# Patient Record
Sex: FEMALE | Race: BLACK OR AFRICAN AMERICAN | URBAN - METROPOLITAN AREA
[De-identification: names, ages, dates, MRNs, and addresses within clinical notes are randomized per-mention and may not be internally consistent; named-entity substitution may affect disease eponyms.]

---

## 2021-11-29 ENCOUNTER — TRANSCRIBE ORDER (OUTPATIENT)
Dept: SCHEDULING | Age: 41
End: 2021-11-29

## 2021-11-29 DIAGNOSIS — Z12.31 VISIT FOR SCREENING MAMMOGRAM: Primary | ICD-10-CM

## 2021-12-08 ENCOUNTER — HOSPITAL ENCOUNTER (OUTPATIENT)
Dept: MAMMOGRAPHY | Age: 41
Discharge: HOME | End: 2021-12-08
Attending: FAMILY MEDICINE
Payer: COMMERCIAL

## 2021-12-08 DIAGNOSIS — Z12.31 VISIT FOR SCREENING MAMMOGRAM: ICD-10-CM

## 2021-12-08 PROCEDURE — 77067 SCR MAMMO BI INCL CAD: CPT

## 2022-03-16 ENCOUNTER — APPOINTMENT (OUTPATIENT)
Dept: GENERAL RADIOLOGY | Age: 42
End: 2022-03-16
Attending: STUDENT IN AN ORGANIZED HEALTH CARE EDUCATION/TRAINING PROGRAM
Payer: COMMERCIAL

## 2022-03-16 ENCOUNTER — APPOINTMENT (OUTPATIENT)
Dept: CT IMAGING | Age: 42
End: 2022-03-16
Attending: NURSE PRACTITIONER
Payer: COMMERCIAL

## 2022-03-16 ENCOUNTER — APPOINTMENT (OUTPATIENT)
Dept: GENERAL RADIOLOGY | Age: 42
End: 2022-03-16
Attending: NURSE PRACTITIONER
Payer: COMMERCIAL

## 2022-03-16 ENCOUNTER — HOSPITAL ENCOUNTER (OUTPATIENT)
Age: 42
Setting detail: OBSERVATION
Discharge: HOME | End: 2022-03-19
Attending: STUDENT IN AN ORGANIZED HEALTH CARE EDUCATION/TRAINING PROGRAM | Admitting: INTERNAL MEDICINE
Payer: COMMERCIAL

## 2022-03-16 ENCOUNTER — APPOINTMENT (OUTPATIENT)
Dept: VASCULAR SURGERY | Age: 42
End: 2022-03-16
Attending: INTERNAL MEDICINE
Payer: COMMERCIAL

## 2022-03-16 ENCOUNTER — APPOINTMENT (OUTPATIENT)
Dept: NON INVASIVE DIAGNOSTICS | Age: 42
End: 2022-03-16
Attending: STUDENT IN AN ORGANIZED HEALTH CARE EDUCATION/TRAINING PROGRAM
Payer: COMMERCIAL

## 2022-03-16 DIAGNOSIS — R06.02 SOB (SHORTNESS OF BREATH): ICD-10-CM

## 2022-03-16 DIAGNOSIS — R06.82 TACHYPNEA: ICD-10-CM

## 2022-03-16 DIAGNOSIS — I26.09 OTHER ACUTE PULMONARY EMBOLISM WITH ACUTE COR PULMONALE (HCC): Primary | ICD-10-CM

## 2022-03-16 PROBLEM — I26.99 PULMONARY EMBOLI (HCC): Status: ACTIVE | Noted: 2022-03-16

## 2022-03-16 LAB
ALBUMIN SERPL-MCNC: 4.1 G/DL (ref 3.5–5)
ALBUMIN/GLOB SERPL: 0.8 {RATIO} (ref 1.1–2.2)
ALP SERPL-CCNC: 76 U/L (ref 45–117)
ALT SERPL-CCNC: 31 U/L (ref 12–78)
AMORPH CRY URNS QL MICRO: ABNORMAL
ANION GAP SERPL CALC-SCNC: 9 MMOL/L (ref 5–15)
APPEARANCE UR: ABNORMAL
APTT PPP: 25.7 SEC (ref 22.1–31)
APTT PPP: 62 SEC (ref 22.1–31)
AST SERPL-CCNC: 14 U/L (ref 15–37)
BACTERIA URNS QL MICRO: ABNORMAL /HPF
BASE DEFICIT BLDV-SCNC: 1.5 MMOL/L
BASOPHILS # BLD: 0.1 K/UL (ref 0–0.1)
BASOPHILS # BLD: 0.1 K/UL (ref 0–0.1)
BASOPHILS NFR BLD: 1 % (ref 0–1)
BASOPHILS NFR BLD: 1 % (ref 0–1)
BILIRUB SERPL-MCNC: 0.4 MG/DL (ref 0.2–1)
BILIRUB UR QL: NEGATIVE
BNP SERPL-MCNC: 5146 PG/ML
BUN SERPL-MCNC: 9 MG/DL (ref 6–20)
BUN/CREAT SERPL: 7 (ref 12–20)
CALCIUM SERPL-MCNC: 9.5 MG/DL (ref 8.5–10.1)
CHLORIDE SERPL-SCNC: 104 MMOL/L (ref 97–108)
CO2 SERPL-SCNC: 25 MMOL/L (ref 21–32)
COLOR UR: ABNORMAL
COMMENT, HOLDF: NORMAL
COVID-19 RAPID TEST, COVR: ABNORMAL
COVID-19 RAPID TEST, COVR: NOT DETECTED
CREAT SERPL-MCNC: 1.25 MG/DL (ref 0.55–1.02)
DIFFERENTIAL METHOD BLD: ABNORMAL
DIFFERENTIAL METHOD BLD: ABNORMAL
ECHO AO ASC DIAM: 2.5 CM
ECHO AO ASCENDING AORTA INDEX: 1.41 CM/M2
ECHO AV AREA PEAK VELOCITY: 2.9 CM2
ECHO AV AREA VTI: 2.8 CM2
ECHO AV AREA/BSA PEAK VELOCITY: 1.6 CM2/M2
ECHO AV AREA/BSA VTI: 1.6 CM2/M2
ECHO AV MEAN GRADIENT: 3 MMHG
ECHO AV MEAN VELOCITY: 0.8 M/S
ECHO AV PEAK GRADIENT: 3 MMHG
ECHO AV PEAK VELOCITY: 0.9 M/S
ECHO AV VELOCITY RATIO: 1
ECHO AV VTI: 17.1 CM
ECHO LA DIAMETER INDEX: 1.58 CM/M2
ECHO LA DIAMETER: 2.8 CM
ECHO LA VOL 4C: 41 ML (ref 22–52)
ECHO LA VOLUME INDEX A4C: 23 ML/M2 (ref 16–34)
ECHO LV E' LATERAL VELOCITY: 10 CM/S
ECHO LV E' SEPTAL VELOCITY: 5 CM/S
ECHO LV FRACTIONAL SHORTENING: 27 % (ref 28–44)
ECHO LV INTERNAL DIMENSION DIASTOLE INDEX: 1.86 CM/M2
ECHO LV INTERNAL DIMENSION DIASTOLIC: 3.3 CM (ref 3.9–5.3)
ECHO LV INTERNAL DIMENSION SYSTOLIC INDEX: 1.36 CM/M2
ECHO LV INTERNAL DIMENSION SYSTOLIC: 2.4 CM
ECHO LV IVSD: 1.2 CM (ref 0.6–0.9)
ECHO LV MASS 2D: 124.8 G (ref 67–162)
ECHO LV MASS INDEX 2D: 70.5 G/M2 (ref 43–95)
ECHO LV POSTERIOR WALL DIASTOLIC: 1.2 CM (ref 0.6–0.9)
ECHO LV RELATIVE WALL THICKNESS RATIO: 0.73
ECHO LVOT AREA: 3.1 CM2
ECHO LVOT AV VTI INDEX: 0.88
ECHO LVOT DIAM: 2 CM
ECHO LVOT MEAN GRADIENT: 2 MMHG
ECHO LVOT PEAK GRADIENT: 3 MMHG
ECHO LVOT PEAK VELOCITY: 0.9 M/S
ECHO LVOT STROKE VOLUME INDEX: 26.6 ML/M2
ECHO LVOT SV: 47.1 ML
ECHO LVOT VTI: 15 CM
ECHO MV A VELOCITY: 0.56 M/S
ECHO MV E DECELERATION TIME (DT): 217.3 MS
ECHO MV E VELOCITY: 0.39 M/S
ECHO MV E/A RATIO: 0.7
ECHO MV E/E' LATERAL: 3.9
ECHO MV E/E' RATIO (AVERAGED): 5.85
ECHO MV E/E' SEPTAL: 7.8
ECHO PULMONARY ARTERY END DIASTOLIC PRESSURE: 13 MMHG
ECHO PV MAX VELOCITY: 0.8 M/S
ECHO PV PEAK GRADIENT: 2 MMHG
ECHO PV REGURGITANT MAX VELOCITY: 1.8 M/S
ECHO RV INTERNAL DIMENSION: 5 CM
ECHO RV TAPSE: 1.6 CM (ref 1.5–2)
ECHO RVOT PEAK GRADIENT: 1 MMHG
ECHO RVOT PEAK VELOCITY: 0.4 M/S
ECHO TV REGURGITANT MAX VELOCITY: 3.67 M/S
ECHO TV REGURGITANT PEAK GRADIENT: 54 MMHG
EOSINOPHIL # BLD: 0 K/UL (ref 0–0.4)
EOSINOPHIL # BLD: 0 K/UL (ref 0–0.4)
EOSINOPHIL NFR BLD: 0 % (ref 0–7)
EOSINOPHIL NFR BLD: 1 % (ref 0–7)
EPITH CASTS URNS QL MICRO: ABNORMAL /LPF
ERYTHROCYTE [DISTWIDTH] IN BLOOD BY AUTOMATED COUNT: 18.1 % (ref 11.5–14.5)
ERYTHROCYTE [DISTWIDTH] IN BLOOD BY AUTOMATED COUNT: 18.3 % (ref 11.5–14.5)
GLOBULIN SER CALC-MCNC: 5 G/DL (ref 2–4)
GLUCOSE SERPL-MCNC: 175 MG/DL (ref 65–100)
GLUCOSE UR STRIP.AUTO-MCNC: NEGATIVE MG/DL
HCG UR QL: NEGATIVE
HCO3 BLDV-SCNC: 23.8 MMOL/L (ref 23–28)
HCT VFR BLD AUTO: 39.8 % (ref 35–47)
HCT VFR BLD AUTO: 47.4 % (ref 35–47)
HGB BLD-MCNC: 12.6 G/DL (ref 11.5–16)
HGB BLD-MCNC: 15 G/DL (ref 11.5–16)
HGB UR QL STRIP: ABNORMAL
IMM GRANULOCYTES # BLD AUTO: 0 K/UL (ref 0–0.04)
IMM GRANULOCYTES # BLD AUTO: 0 K/UL (ref 0–0.04)
IMM GRANULOCYTES NFR BLD AUTO: 0 % (ref 0–0.5)
IMM GRANULOCYTES NFR BLD AUTO: 0 % (ref 0–0.5)
KETONES UR QL STRIP.AUTO: 15 MG/DL
LEUKOCYTE ESTERASE UR QL STRIP.AUTO: ABNORMAL
LYMPHOCYTES # BLD: 3.1 K/UL (ref 0.8–3.5)
LYMPHOCYTES # BLD: 3.2 K/UL (ref 0.8–3.5)
LYMPHOCYTES NFR BLD: 39 % (ref 12–49)
LYMPHOCYTES NFR BLD: 40 % (ref 12–49)
MAGNESIUM SERPL-MCNC: 2.1 MG/DL (ref 1.6–2.4)
MCH RBC QN AUTO: 27.2 PG (ref 26–34)
MCH RBC QN AUTO: 27.3 PG (ref 26–34)
MCHC RBC AUTO-ENTMCNC: 31.6 G/DL (ref 30–36.5)
MCHC RBC AUTO-ENTMCNC: 31.7 G/DL (ref 30–36.5)
MCV RBC AUTO: 85.9 FL (ref 80–99)
MCV RBC AUTO: 86.3 FL (ref 80–99)
MONOCYTES # BLD: 0.4 K/UL (ref 0–1)
MONOCYTES # BLD: 0.5 K/UL (ref 0–1)
MONOCYTES NFR BLD: 5 % (ref 5–13)
MONOCYTES NFR BLD: 6 % (ref 5–13)
NEUTS SEG # BLD: 4.1 K/UL (ref 1.8–8)
NEUTS SEG # BLD: 4.4 K/UL (ref 1.8–8)
NEUTS SEG NFR BLD: 53 % (ref 32–75)
NEUTS SEG NFR BLD: 54 % (ref 32–75)
NITRITE UR QL STRIP.AUTO: NEGATIVE
NRBC # BLD: 0 K/UL (ref 0–0.01)
NRBC # BLD: 0.02 K/UL (ref 0–0.01)
NRBC BLD-RTO: 0 PER 100 WBC
NRBC BLD-RTO: 0.3 PER 100 WBC
PCO2 BLDV: 41.3 MMHG (ref 41–51)
PH BLDV: 7.37 [PH] (ref 7.32–7.42)
PH UR STRIP: 6 [PH] (ref 5–8)
PLATELET # BLD AUTO: 328 K/UL (ref 150–400)
PLATELET # BLD AUTO: 391 K/UL (ref 150–400)
PMV BLD AUTO: 10 FL (ref 8.9–12.9)
PMV BLD AUTO: 10.2 FL (ref 8.9–12.9)
PO2 BLDV: 16 MMHG (ref 25–40)
POTASSIUM SERPL-SCNC: 3.9 MMOL/L (ref 3.5–5.1)
PROT SERPL-MCNC: 9.1 G/DL (ref 6.4–8.2)
PROT UR STRIP-MCNC: 300 MG/DL
RBC # BLD AUTO: 4.61 M/UL (ref 3.8–5.2)
RBC # BLD AUTO: 5.52 M/UL (ref 3.8–5.2)
RBC #/AREA URNS HPF: ABNORMAL /HPF (ref 0–5)
SAMPLES BEING HELD,HOLD: NORMAL
SAO2 % BLDV: 19.8 % (ref 65–88)
SERVICE CMNT-IMP: ABNORMAL
SODIUM SERPL-SCNC: 138 MMOL/L (ref 136–145)
SOURCE, COVRS: ABNORMAL
SOURCE, COVRS: NORMAL
SP GR UR REFRACTOMETRY: 1.02 (ref 1–1.03)
SPECIMEN TYPE: ABNORMAL
THERAPEUTIC RANGE,PTTT: ABNORMAL SECS (ref 58–77)
THERAPEUTIC RANGE,PTTT: NORMAL SECS (ref 58–77)
TROPONIN-HIGH SENSITIVITY: 1045 NG/L (ref 0–51)
UR CULT HOLD, URHOLD: NORMAL
UROBILINOGEN UR QL STRIP.AUTO: 1 EU/DL (ref 0.2–1)
WBC # BLD AUTO: 7.9 K/UL (ref 3.6–11)
WBC # BLD AUTO: 8 K/UL (ref 3.6–11)
WBC URNS QL MICRO: ABNORMAL /HPF (ref 0–4)

## 2022-03-16 PROCEDURE — 65660000000 HC RM CCU STEPDOWN

## 2022-03-16 PROCEDURE — 74011000258 HC RX REV CODE- 258: Performed by: STUDENT IN AN ORGANIZED HEALTH CARE EDUCATION/TRAINING PROGRAM

## 2022-03-16 PROCEDURE — 74011000636 HC RX REV CODE- 636: Performed by: RADIOLOGY

## 2022-03-16 PROCEDURE — 74011250637 HC RX REV CODE- 250/637: Performed by: INTERNAL MEDICINE

## 2022-03-16 PROCEDURE — 80053 COMPREHEN METABOLIC PANEL: CPT

## 2022-03-16 PROCEDURE — 74011250637 HC RX REV CODE- 250/637: Performed by: NURSE PRACTITIONER

## 2022-03-16 PROCEDURE — 84484 ASSAY OF TROPONIN QUANT: CPT

## 2022-03-16 PROCEDURE — 87635 SARS-COV-2 COVID-19 AMP PRB: CPT

## 2022-03-16 PROCEDURE — 36415 COLL VENOUS BLD VENIPUNCTURE: CPT

## 2022-03-16 PROCEDURE — G0378 HOSPITAL OBSERVATION PER HR: HCPCS

## 2022-03-16 PROCEDURE — 74011000250 HC RX REV CODE- 250: Performed by: INTERNAL MEDICINE

## 2022-03-16 PROCEDURE — 81025 URINE PREGNANCY TEST: CPT

## 2022-03-16 PROCEDURE — 96366 THER/PROPH/DIAG IV INF ADDON: CPT

## 2022-03-16 PROCEDURE — 74011250636 HC RX REV CODE- 250/636: Performed by: STUDENT IN AN ORGANIZED HEALTH CARE EDUCATION/TRAINING PROGRAM

## 2022-03-16 PROCEDURE — 93306 TTE W/DOPPLER COMPLETE: CPT

## 2022-03-16 PROCEDURE — 71275 CT ANGIOGRAPHY CHEST: CPT

## 2022-03-16 PROCEDURE — 74011250636 HC RX REV CODE- 250/636: Performed by: NURSE PRACTITIONER

## 2022-03-16 PROCEDURE — 83735 ASSAY OF MAGNESIUM: CPT

## 2022-03-16 PROCEDURE — 99285 EMERGENCY DEPT VISIT HI MDM: CPT

## 2022-03-16 PROCEDURE — 93970 EXTREMITY STUDY: CPT

## 2022-03-16 PROCEDURE — 93005 ELECTROCARDIOGRAM TRACING: CPT

## 2022-03-16 PROCEDURE — 85025 COMPLETE CBC W/AUTO DIFF WBC: CPT

## 2022-03-16 PROCEDURE — 93306 TTE W/DOPPLER COMPLETE: CPT | Performed by: INTERNAL MEDICINE

## 2022-03-16 PROCEDURE — 71046 X-RAY EXAM CHEST 2 VIEWS: CPT

## 2022-03-16 PROCEDURE — 82803 BLOOD GASES ANY COMBINATION: CPT

## 2022-03-16 PROCEDURE — 81001 URINALYSIS AUTO W/SCOPE: CPT

## 2022-03-16 PROCEDURE — 85730 THROMBOPLASTIN TIME PARTIAL: CPT

## 2022-03-16 PROCEDURE — 83880 ASSAY OF NATRIURETIC PEPTIDE: CPT

## 2022-03-16 PROCEDURE — 96365 THER/PROPH/DIAG IV INF INIT: CPT

## 2022-03-16 RX ORDER — SODIUM CHLORIDE 0.9 % (FLUSH) 0.9 %
5-40 SYRINGE (ML) INJECTION EVERY 8 HOURS
Status: DISCONTINUED | OUTPATIENT
Start: 2022-03-16 | End: 2022-03-19 | Stop reason: HOSPADM

## 2022-03-16 RX ORDER — LISINOPRIL 20 MG/1
20 TABLET ORAL DAILY
COMMUNITY

## 2022-03-16 RX ORDER — LISINOPRIL 20 MG/1
20 TABLET ORAL DAILY
Status: DISCONTINUED | OUTPATIENT
Start: 2022-03-17 | End: 2022-03-19 | Stop reason: HOSPADM

## 2022-03-16 RX ORDER — ONDANSETRON 4 MG/1
4 TABLET, ORALLY DISINTEGRATING ORAL
Status: DISCONTINUED | OUTPATIENT
Start: 2022-03-16 | End: 2022-03-19 | Stop reason: HOSPADM

## 2022-03-16 RX ORDER — HEPARIN SODIUM 1000 [USP'U]/ML
80 INJECTION, SOLUTION INTRAVENOUS; SUBCUTANEOUS ONCE
Status: COMPLETED | OUTPATIENT
Start: 2022-03-16 | End: 2022-03-16

## 2022-03-16 RX ORDER — GUAIFENESIN 100 MG/5ML
324 LIQUID (ML) ORAL
Status: COMPLETED | OUTPATIENT
Start: 2022-03-16 | End: 2022-03-16

## 2022-03-16 RX ORDER — ACETAMINOPHEN 325 MG/1
650 TABLET ORAL
Status: DISCONTINUED | OUTPATIENT
Start: 2022-03-16 | End: 2022-03-19 | Stop reason: HOSPADM

## 2022-03-16 RX ORDER — ACETAMINOPHEN 650 MG/1
650 SUPPOSITORY RECTAL
Status: DISCONTINUED | OUTPATIENT
Start: 2022-03-16 | End: 2022-03-19 | Stop reason: HOSPADM

## 2022-03-16 RX ORDER — ATENOLOL 100 MG/1
100 TABLET ORAL DAILY
COMMUNITY

## 2022-03-16 RX ORDER — ATENOLOL 25 MG/1
25 TABLET ORAL DAILY
Status: DISCONTINUED | OUTPATIENT
Start: 2022-03-16 | End: 2022-03-16

## 2022-03-16 RX ORDER — ONDANSETRON 2 MG/ML
4 INJECTION INTRAMUSCULAR; INTRAVENOUS
Status: DISCONTINUED | OUTPATIENT
Start: 2022-03-16 | End: 2022-03-19 | Stop reason: HOSPADM

## 2022-03-16 RX ORDER — HEPARIN SODIUM 10000 [USP'U]/100ML
18-36 INJECTION, SOLUTION INTRAVENOUS
Status: DISCONTINUED | OUTPATIENT
Start: 2022-03-16 | End: 2022-03-19 | Stop reason: SDUPTHER

## 2022-03-16 RX ORDER — ATENOLOL 50 MG/1
100 TABLET ORAL DAILY
Status: DISCONTINUED | OUTPATIENT
Start: 2022-03-17 | End: 2022-03-19 | Stop reason: HOSPADM

## 2022-03-16 RX ORDER — POLYETHYLENE GLYCOL 3350 17 G/17G
17 POWDER, FOR SOLUTION ORAL DAILY PRN
Status: DISCONTINUED | OUTPATIENT
Start: 2022-03-16 | End: 2022-03-19 | Stop reason: HOSPADM

## 2022-03-16 RX ORDER — SODIUM CHLORIDE 0.9 % (FLUSH) 0.9 %
5-40 SYRINGE (ML) INJECTION AS NEEDED
Status: DISCONTINUED | OUTPATIENT
Start: 2022-03-16 | End: 2022-03-19 | Stop reason: HOSPADM

## 2022-03-16 RX ADMIN — HEPARIN SODIUM 5740 UNITS: 1000 INJECTION INTRAVENOUS; SUBCUTANEOUS at 13:59

## 2022-03-16 RX ADMIN — SODIUM CHLORIDE, PRESERVATIVE FREE 10 ML: 5 INJECTION INTRAVENOUS at 21:06

## 2022-03-16 RX ADMIN — SODIUM CHLORIDE 1000 ML: 9 INJECTION, SOLUTION INTRAVENOUS at 11:47

## 2022-03-16 RX ADMIN — ATENOLOL 25 MG: 50 TABLET ORAL at 17:30

## 2022-03-16 RX ADMIN — HEPARIN SODIUM 18 UNITS/KG/HR: 10000 INJECTION INTRAVENOUS; SUBCUTANEOUS at 13:59

## 2022-03-16 RX ADMIN — ASPIRIN 324 MG: 81 TABLET, CHEWABLE ORAL at 11:46

## 2022-03-16 RX ADMIN — SODIUM CHLORIDE, PRESERVATIVE FREE 10 ML: 5 INJECTION INTRAVENOUS at 16:26

## 2022-03-16 NOTE — PROGRESS NOTES
3/16/2022  2:26 PM  Case management note    Reason for Admission:  PE  Patient came to hospital for SOB and wheezing. Patient is independent and drives. She lives with s/o but not . Patient has history of asthma. Kaycee @ [employer]                     RUR Score:                     Plan for utilizing home health:          PCP: First and Last name:  Collin Oneil MD     Name of Practice:    Are you a current patient: Yes/No:    Approximate date of last visit:    Can you participate in a virtual visit with your PCP:                     Current Advanced Directive/Advance Care Plan: No Order      Healthcare Decision Maker:   Jovanna Apodaca s/o                              Transition of Care Plan:                      1. Home with family assistance  2. PCP follow up  3. AD planning  4. CM to follow for discharge needs    Care Management Interventions  PCP Verified by CM:  Yes (Dr. Cinthia Garcia )  Support Systems: Spouse/Significant Other  Confirm Follow Up Transport: Family  The Plan for Transition of Care is Related to the Following Treatment Goals : PE  Discharge Location  Patient Expects to be Discharged to[de-identified] Home with family assistance  Clara Chaidez

## 2022-03-16 NOTE — ED TRIAGE NOTES
Pt presents to ER with c/o increased SOB x3 weeks. Denies myalgia, fevers, chills, n/v, CP, dizziness. Denies leg swelling. Pt has cough x6 months with phlegm. Pt c/o SOB with exertion. Pt denies h/o blood clots or cardiac issues.

## 2022-03-16 NOTE — ED PROVIDER NOTES
62-year-old female with a past medical history of \"seasonal asthma\" and hypertension presents to the ER today for evaluation of cough, easy fatigability, and shortness of breath. Patient states that her symptoms have been ongoing and progressive over approximately a 3 to 4 weeks span. She also reports daily cough with \"clearish white\" phlegm production that seems to be more prominent in the evening hours. She has been using an  inhaler with no relief in her symptoms. She does not believe that her symptoms were preceded by viral URI like symptoms. She has not been tested for COVID-19. She additionally denies any chest pain (\"but I sometimes have a little bit of chest pressure when the shortness of breath comes\"), fevers, extremity swelling, hemoptysis, or history of VTE. She denies any recent surgical procedures or use of oral contraceptives. She denies any family history of coagulation disorders or premature heart disease. No past medical history on file. No past surgical history on file. No family history on file. Social History     Socioeconomic History    Marital status: SINGLE     Spouse name: Not on file    Number of children: Not on file    Years of education: Not on file    Highest education level: Not on file   Occupational History    Not on file   Tobacco Use    Smoking status: Not on file    Smokeless tobacco: Not on file   Substance and Sexual Activity    Alcohol use: Not on file    Drug use: Not on file    Sexual activity: Not on file   Other Topics Concern    Not on file   Social History Narrative    Not on file     Social Determinants of Health     Financial Resource Strain:     Difficulty of Paying Living Expenses: Not on file   Food Insecurity:     Worried About Running Out of Food in the Last Year: Not on file    Jhony of Food in the Last Year: Not on file   Transportation Needs:     Lack of Transportation (Medical):  Not on file    Lack of Transportation (Non-Medical): Not on file   Physical Activity:     Days of Exercise per Week: Not on file    Minutes of Exercise per Session: Not on file   Stress:     Feeling of Stress : Not on file   Social Connections:     Frequency of Communication with Friends and Family: Not on file    Frequency of Social Gatherings with Friends and Family: Not on file    Attends Jewish Services: Not on file    Active Member of [address] [place] [address] or Organizations: Not on file    Attends Club or Organization Meetings: Not on file    Marital Status: Not on file   Intimate Partner Violence:     Fear of Current or Ex-Partner: Not on file    Emotionally Abused: Not on file    Physically Abused: Not on file    Sexually Abused: Not on file   Housing Stability:     Unable to Pay for Housing in the Last Year: Not on file    Number of [place] in the Last Year: Not on file    Unstable Housing in the Last Year: Not on file         ALLERGIES: Patient has no known allergies. Review of Systems   Constitutional: Positive for fatigue. Negative for fever. HENT: Negative for sore throat. Eyes: Negative for visual disturbance. Respiratory: Positive for cough, chest tightness and shortness of breath. Cardiovascular: Negative for palpitations. Gastrointestinal: Negative for vomiting. Genitourinary: Negative for dysuria. Musculoskeletal: Negative for myalgias. Skin: Negative for rash. Neurological: Negative for syncope. Vitals:    03/16/22 0923   BP: (!) 149/87   Pulse: 90   Resp: 23   Temp: 98.2 °F (36.8 °C)   SpO2: 100%   Weight: 71.7 kg (158 lb)   Height: 5' 4\" (1.626 m)            Physical Exam  Vitals and nursing note reviewed. Constitutional:       General: She is not in acute distress. Appearance: Normal appearance. She is well-developed. She is not ill-appearing. HENT:      Head: Normocephalic and atraumatic.       Right Ear: External ear normal.      Left Ear: External ear normal.      Nose: Nose normal.      Mouth/Throat:      Mouth: Mucous membranes are moist.      Pharynx: Oropharynx is clear. Eyes:      General: No scleral icterus. Extraocular Movements: Extraocular movements intact. Conjunctiva/sclera: Conjunctivae normal.      Pupils: Pupils are equal, round, and reactive to light. Cardiovascular:      Rate and Rhythm: Normal rate and regular rhythm. Pulses: Normal pulses. Radial pulses are 2+ on the right side and 2+ on the left side. Posterior tibial pulses are 2+ on the right side and 2+ on the left side. Heart sounds: Normal heart sounds. Pulmonary:      Effort: Tachypnea present. Comments: Clear lung sounds with mild diminished airflow in the left base  Chest:      Chest wall: No tenderness. Abdominal:      General: Abdomen is flat. Bowel sounds are normal.      Palpations: Abdomen is soft. Musculoskeletal:         General: Normal range of motion. Cervical back: Normal range of motion and neck supple. No rigidity. No muscular tenderness. Right lower leg: No edema. Left lower leg: No edema. Skin:     General: Skin is warm and dry. Coloration: Skin is not pale. Neurological:      General: No focal deficit present. Mental Status: She is alert and oriented to person, place, and time. Psychiatric:         Mood and Affect: Mood is anxious. Behavior: Behavior normal.         Thought Content:  Thought content normal.         Judgment: Judgment normal.          MDM      VITAL SIGNS:  Patient Vitals for the past 4 hrs:   Temp Pulse Resp BP SpO2   03/16/22 1145  88 23 (!) 129/98 96 %   03/16/22 1130  80 28 (!) 139/124 97 %   03/16/22 1115  79 24 (!) 120/91 96 %   03/16/22 1100  82 26 110/84 98 %   03/16/22 1045  81 13 129/88 97 %   03/16/22 1030  82 18 124/83 95 %   03/16/22 1015  90 28  96 %   03/16/22 1000  89 20 (!) 136/115 96 %   03/16/22 0923 98.2 °F (36.8 °C) 90 23 (!) 149/87 100 % LABS:  Recent Results (from the past 6 hour(s))   EKG, 12 LEAD, INITIAL    Collection Time: 03/16/22  9:46 AM   Result Value Ref Range    Ventricular Rate 87 BPM    Atrial Rate 87 BPM    P-R Interval 146 ms    QRS Duration 86 ms    Q-T Interval 432 ms    QTC Calculation (Bezet) 519 ms    Calculated P Axis 59 degrees    Calculated R Axis 59 degrees    Calculated T Axis -9 degrees    Diagnosis       Normal sinus rhythm  T wave abnormality, consider inferior ischemia  T wave abnormality, consider anterior ischemia  Prolonged QT  Abnormal ECG  No previous ECGs available     SAMPLES BEING HELD    Collection Time: 03/16/22 10:00 AM   Result Value Ref Range    SAMPLES BEING HELD RED,BLUE,SST     COMMENT        Add-on orders for these samples will be processed based on acceptable specimen integrity and analyte stability, which may vary by analyte. CBC WITH AUTOMATED DIFF    Collection Time: 03/16/22 10:00 AM   Result Value Ref Range    WBC 8.0 3.6 - 11.0 K/uL    RBC 5.52 (H) 3.80 - 5.20 M/uL    HGB 15.0 11.5 - 16.0 g/dL    HCT 47.4 (H) 35.0 - 47.0 %    MCV 85.9 80.0 - 99.0 FL    MCH 27.2 26.0 - 34.0 PG    MCHC 31.6 30.0 - 36.5 g/dL    RDW 18.3 (H) 11.5 - 14.5 %    PLATELET 226 542 - 469 K/uL    MPV 10.2 8.9 - 12.9 FL    NRBC 0.0 0  WBC    ABSOLUTE NRBC 0.00 0.00 - 0.01 K/uL    NEUTROPHILS 54 32 - 75 %    LYMPHOCYTES 39 12 - 49 %    MONOCYTES 5 5 - 13 %    EOSINOPHILS 1 0 - 7 %    BASOPHILS 1 0 - 1 %    IMMATURE GRANULOCYTES 0 0.0 - 0.5 %    ABS. NEUTROPHILS 4.4 1.8 - 8.0 K/UL    ABS. LYMPHOCYTES 3.1 0.8 - 3.5 K/UL    ABS. MONOCYTES 0.4 0.0 - 1.0 K/UL    ABS. EOSINOPHILS 0.0 0.0 - 0.4 K/UL    ABS. BASOPHILS 0.1 0.0 - 0.1 K/UL    ABS. IMM.  GRANS. 0.0 0.00 - 0.04 K/UL    DF AUTOMATED     METABOLIC PANEL, COMPREHENSIVE    Collection Time: 03/16/22 10:00 AM   Result Value Ref Range    Sodium 138 136 - 145 mmol/L    Potassium 3.9 3.5 - 5.1 mmol/L    Chloride 104 97 - 108 mmol/L    CO2 25 21 - 32 mmol/L    Anion gap 9 5 - 15 mmol/L    Glucose 175 (H) 65 - 100 mg/dL    BUN 9 6 - 20 MG/DL    Creatinine 1.25 (H) 0.55 - 1.02 MG/DL    BUN/Creatinine ratio 7 (L) 12 - 20      GFR est AA 58 (L) >60 ml/min/1.73m2    GFR est non-AA 47 (L) >60 ml/min/1.73m2    Calcium 9.5 8.5 - 10.1 MG/DL    Bilirubin, total 0.4 0.2 - 1.0 MG/DL    ALT (SGPT) 31 12 - 78 U/L    AST (SGOT) 14 (L) 15 - 37 U/L    Alk. phosphatase 76 45 - 117 U/L    Protein, total 9.1 (H) 6.4 - 8.2 g/dL    Albumin 4.1 3.5 - 5.0 g/dL    Globulin 5.0 (H) 2.0 - 4.0 g/dL    A-G Ratio 0.8 (L) 1.1 - 2.2     TROPONIN-HIGH SENSITIVITY    Collection Time: 03/16/22 10:00 AM   Result Value Ref Range    Troponin-High Sensitivity 1,045 (HH) 0 - 51 ng/L   URINALYSIS W/MICROSCOPIC    Collection Time: 03/16/22 10:00 AM   Result Value Ref Range    Color YELLOW/STRAW      Appearance TURBID (A) CLEAR      Specific gravity 1.023 1.003 - 1.030      pH (UA) 6.0 5.0 - 8.0      Protein 300 (A) NEG mg/dL    Glucose Negative NEG mg/dL    Ketone 15 (A) NEG mg/dL    Bilirubin Negative NEG      Blood MODERATE (A) NEG      Urobilinogen 1.0 0.2 - 1.0 EU/dL    Nitrites Negative NEG      Leukocyte Esterase TRACE (A) NEG      WBC 5-10 0 - 4 /hpf    RBC 20-50 0 - 5 /hpf    Epithelial cells MANY (A) FEW /lpf    Bacteria 1+ (A) NEG /hpf    Amorphous Crystals 1+ (A) NEG   URINE CULTURE HOLD SAMPLE    Collection Time: 03/16/22 10:00 AM    Specimen: Serum; Urine   Result Value Ref Range    Urine culture hold        Urine on hold in Microbiology dept for 2 days. If unpreserved urine is submitted, it cannot be used for addtional testing after 24 hours, recollection will be required.    COVID-19 RAPID TEST    Collection Time: 03/16/22 10:00 AM   Result Value Ref Range    Specimen source Nasopharyngeal      COVID-19 rapid test Indeterminate (A) NOTD     MAGNESIUM    Collection Time: 03/16/22 10:00 AM   Result Value Ref Range    Magnesium 2.1 1.6 - 2.4 mg/dL   NT-PRO BNP    Collection Time: 03/16/22 10:00 AM   Result Value Ref Range    NT pro-BNP 5,146 (H) <125 PG/ML   HCG URINE, QL. - POC    Collection Time: 03/16/22 10:18 AM   Result Value Ref Range    Pregnancy test,urine (POC) Negative NEG     POC VENOUS BLOOD GAS    Collection Time: 03/16/22 10:22 AM   Result Value Ref Range    pH, venous (POC) 7.37 7.32 - 7.42      pCO2, venous (POC) 41.3 41 - 51 MMHG    pO2, venous (POC) 16 (L) 25 - 40 mmHg    HCO3, venous (POC) 23.8 23.0 - 28.0 MMOL/L    sO2, venous (POC) 19.8 (L) 65 - 88 %    Base deficit, venous (POC) 1.5 mmol/L    Specimen type (POC) VENOUS BLOOD      Performed by Julee Sands    COVID-19 RAPID TEST    Collection Time: 03/16/22 10:57 AM   Result Value Ref Range    Specimen source Nasopharyngeal      COVID-19 rapid test Not detected NOTD          IMAGING:  CTA CHEST W OR W WO CONT   Final Result   Fairly significant bilateral pulmonary emboli. The findings were called to Dr. Jessica Grewal on 3/16/22 at  by myself. [phone]      XR CHEST PA LAT   Final Result   No acute process. Medications During Visit:  Medications   iopamidoL (ISOVUE-370) 76 % injection 100 mL (has no administration in time range)   sodium chloride 0.9 % bolus infusion 1,000 mL (1,000 mL IntraVENous New Bag 3/16/22 1147)   heparin (porcine) 1,000 unit/mL injection 5,740 Units (has no administration in time range)   heparin 25,000 units in D5W 250 ml infusion (has no administration in time range)   aspirin chewable tablet 324 mg (324 mg Oral Given 3/16/22 1146)         DECISION MAKING:  Netta Hernandez is a 37 y.o. female who comes in as above. Work-up remarkable for large bilateral PE with elevated troponin and proBNP Patient remains normoxic and hemodynamically stable. Patient started on heparin drip and will be admitted to medicine for further management. Perfect Serve Consult for Admission  12:30 PM    ED Room Number: ER08/08  Patient Name and age:  Netta Hernandez 37 y.o.  female  Working Diagnosis:   1.  Other acute pulmonary embolism with acute cor pulmonale (HCC)    2. SOB (shortness of breath)    3. Tachypnea        COVID-19 Suspicion:  no  Sepsis present:  no  Reassessment needed: no  Code Status:  Full Code  Readmission: no  Isolation Requirements:  no  Recommended Level of Care:  step down  Department:[facility] ED - [phone]  Other:  Large bilateral unprovoked PE. IMPRESSION:  1. Other acute pulmonary embolism with acute cor pulmonale ([address]    2. SOB (shortness of breath)    3. Tachypnea        DISPOSITION:  Admitted        CRITICAL CARE NOTE :    12:32 PM      IMPENDING DETERIORATION -Respiratory and Cardiovascular    ASSOCIATED RISK FACTORS - Hypotension and Shock    MANAGEMENT- Supervision of Care    INTERPRETATION -  Xrays, CT Scan, Blood Gases, ECG and Blood Pressure    INTERVENTIONS - hemodynamic mngmt and Initiation of anticoagulation for large bilateral pulmonary emboli. TREATMENT RESPONSE -Stable    PERFORMED BY - Self        NOTES   :      I have spent 40 minutes of critical care time involved in lab review, consultations with specialist, family decision- making, bedside attention and documentation. During this entire length of time I was immediately available to the patient .     Uyen Muller NP

## 2022-03-16 NOTE — PROGRESS NOTES
Spiritual Care Assessment/Progress Note  [address]      NAME: Wade Jc      MRN: [id number]  AGE: 37 y.o. SEX: female  Jewish Affiliation: No preference   Language: English     3/16/2022     Total Time (in minutes): 5     Spiritual Assessment begun in [facility] EMERGENCY DEPT through conversation with:         []Patient        [] Family    [] Friend(s)        Reason for Consult: Emergency Department visit     Spiritual beliefs: (Please include comment if needed)     [] Identifies with a mariano tradition:         [] Supported by a mariano community:            [] Claims no spiritual orientation:           [] Seeking spiritual identity:                [] Adheres to an individual form of spirituality:           [x] Not able to assess:                           Identified resources for coping:      [] Prayer                               [] Music                  [] Guided Imagery     [] Family/friends                 [] Pet visits     [] Devotional reading                         [x] Unknown     [] Other:                                              Interventions offered during this visit: (See comments for more details)    Patient Interventions: Initial visit           Plan of Care:     [] Support spiritual and/or cultural needs    [] Support AMD and/or advance care planning process      [] Support grieving process   [] Coordinate Rites and/or Rituals    [] Coordination with community clergy   [] No spiritual needs identified at this time   [] Detailed Plan of Care below (See Comments)  [] Make referral to Music Therapy  [] Make referral to Pet Therapy     [] Make referral to Addiction services  [] Make referral to [facility]  [] Make referral to Spiritual Care Partner  [] No future visits requested        [x] Contact Spiritual Care for further referrals     Attempted to visit pt for initial spiritual assessment. Unable to complete assessment at this time, pt sleeping and did not awake.  Pt's chart was consulted.   Chaplain Swan MDiv, MS, [facility]

## 2022-03-17 LAB
ANION GAP SERPL CALC-SCNC: 9 MMOL/L (ref 5–15)
APTT PPP: 51.4 SEC (ref 22.1–31)
APTT PPP: 59.1 SEC (ref 22.1–31)
ATRIAL RATE: 87 BPM
BUN SERPL-MCNC: 9 MG/DL (ref 6–20)
BUN/CREAT SERPL: 12 (ref 12–20)
CALCIUM SERPL-MCNC: 8.5 MG/DL (ref 8.5–10.1)
CALCULATED P AXIS, ECG09: 59 DEGREES
CALCULATED R AXIS, ECG10: 59 DEGREES
CALCULATED T AXIS, ECG11: -9 DEGREES
CHLORIDE SERPL-SCNC: 110 MMOL/L (ref 97–108)
CO2 SERPL-SCNC: 21 MMOL/L (ref 21–32)
CREAT SERPL-MCNC: 0.74 MG/DL (ref 0.55–1.02)
DIAGNOSIS, 93000: NORMAL
ERYTHROCYTE [DISTWIDTH] IN BLOOD BY AUTOMATED COUNT: 17.9 % (ref 11.5–14.5)
GLUCOSE SERPL-MCNC: 147 MG/DL (ref 65–100)
HCT VFR BLD AUTO: 37.4 % (ref 35–47)
HGB BLD-MCNC: 11.8 G/DL (ref 11.5–16)
MCH RBC QN AUTO: 26.9 PG (ref 26–34)
MCHC RBC AUTO-ENTMCNC: 31.6 G/DL (ref 30–36.5)
MCV RBC AUTO: 85.4 FL (ref 80–99)
NRBC # BLD: 0 K/UL (ref 0–0.01)
NRBC BLD-RTO: 0 PER 100 WBC
P-R INTERVAL, ECG05: 146 MS
PLATELET # BLD AUTO: 303 K/UL (ref 150–400)
PMV BLD AUTO: 10.1 FL (ref 8.9–12.9)
POTASSIUM SERPL-SCNC: 3.8 MMOL/L (ref 3.5–5.1)
Q-T INTERVAL, ECG07: 432 MS
QRS DURATION, ECG06: 86 MS
QTC CALCULATION (BEZET), ECG08: 519 MS
RBC # BLD AUTO: 4.38 M/UL (ref 3.8–5.2)
SODIUM SERPL-SCNC: 140 MMOL/L (ref 136–145)
THERAPEUTIC RANGE,PTTT: ABNORMAL SECS (ref 58–77)
THERAPEUTIC RANGE,PTTT: ABNORMAL SECS (ref 58–77)
TROPONIN-HIGH SENSITIVITY: 872 NG/L (ref 0–51)
VENTRICULAR RATE, ECG03: 87 BPM
WBC # BLD AUTO: 9.8 K/UL (ref 3.6–11)

## 2022-03-17 PROCEDURE — 96366 THER/PROPH/DIAG IV INF ADDON: CPT

## 2022-03-17 PROCEDURE — 74011000250 HC RX REV CODE- 250: Performed by: INTERNAL MEDICINE

## 2022-03-17 PROCEDURE — 74011000258 HC RX REV CODE- 258: Performed by: STUDENT IN AN ORGANIZED HEALTH CARE EDUCATION/TRAINING PROGRAM

## 2022-03-17 PROCEDURE — 36415 COLL VENOUS BLD VENIPUNCTURE: CPT

## 2022-03-17 PROCEDURE — 85730 THROMBOPLASTIN TIME PARTIAL: CPT

## 2022-03-17 PROCEDURE — 85027 COMPLETE CBC AUTOMATED: CPT

## 2022-03-17 PROCEDURE — G0378 HOSPITAL OBSERVATION PER HR: HCPCS

## 2022-03-17 PROCEDURE — APPSS30 APP SPLIT SHARED TIME 16-30 MINUTES: Performed by: NURSE PRACTITIONER

## 2022-03-17 PROCEDURE — 74011250636 HC RX REV CODE- 250/636: Performed by: STUDENT IN AN ORGANIZED HEALTH CARE EDUCATION/TRAINING PROGRAM

## 2022-03-17 PROCEDURE — 65660000000 HC RM CCU STEPDOWN

## 2022-03-17 PROCEDURE — 80048 BASIC METABOLIC PNL TOTAL CA: CPT

## 2022-03-17 PROCEDURE — 99205 OFFICE O/P NEW HI 60 MIN: CPT | Performed by: SPECIALIST

## 2022-03-17 PROCEDURE — 74011250637 HC RX REV CODE- 250/637: Performed by: INTERNAL MEDICINE

## 2022-03-17 PROCEDURE — 84484 ASSAY OF TROPONIN QUANT: CPT

## 2022-03-17 RX ADMIN — LISINOPRIL 20 MG: 20 TABLET ORAL at 08:28

## 2022-03-17 RX ADMIN — SODIUM CHLORIDE, PRESERVATIVE FREE 10 ML: 5 INJECTION INTRAVENOUS at 14:00

## 2022-03-17 RX ADMIN — SODIUM CHLORIDE, PRESERVATIVE FREE 10 ML: 5 INJECTION INTRAVENOUS at 21:45

## 2022-03-17 RX ADMIN — HEPARIN SODIUM 18 UNITS/KG/HR: 10000 INJECTION INTRAVENOUS; SUBCUTANEOUS at 20:06

## 2022-03-17 RX ADMIN — SODIUM CHLORIDE, PRESERVATIVE FREE 10 ML: 5 INJECTION INTRAVENOUS at 05:11

## 2022-03-17 RX ADMIN — ATENOLOL 100 MG: 50 TABLET ORAL at 08:28

## 2022-03-17 RX ADMIN — HEPARIN SODIUM 18 UNITS/KG/HR: 10000 INJECTION INTRAVENOUS; SUBCUTANEOUS at 10:09

## 2022-03-17 NOTE — PROGRESS NOTES
3/17/2022   CARE MANAGEMENT NOTE:  Pt transferred from [facility] to the 5th floor. EMR reviewed and handoff received from previous  Mary Sutherland). Pt was admitted for PE. Reportedly, pt resides with her significant other Laura Clayton ([phone]). RUR 6%    Transition Plan of Care:  1. Plan is for pt to return home  2. Outpt f/u  3. Pt will arrange her own transport home    CM will continue to follow pt until discharged.   Sabrina

## 2022-03-17 NOTE — PROGRESS NOTES
[facility]  [address] [facility], [address] [place], [place] [address]   [phone]      Medical Progress Note      NAME: Alexei Sun   :  1980  MRM:  [id number]    Date of service: 3/17/2022  7:07 AM       Assessment and Plan:   1.  Pulmonary emboli ([address] (3/16/2022).   CT of the chest: bilateral pulmonary emboli.  Continue heparin drip transition to JOAN. Echocardiogram: Right ventricle is mildly dilated. Normal wall thickness. Global hypokinesis present. Mildly reduced systolic function. Tricuspid Valve: Moderately elevated RVSP. bilateral leg Doppler is negative for DVT.              2.  Exertional SOB/ elevated proBNP.  This is most likely secondary to above.  echocardiogram as above. Cardiology consult              3.  HTN.  Resume home atenolol and lisinopril           4. Elevated troponin. Denies chest pain. Likely type 2 MI due to PE. Consult cardiology. Echocardiogram as above. Subjective:     Chief Complaint[de-identified] Patient was seen and examined as a follow up for BL PE. Chart was reviewed. SOB is better     ROS:  (bold if positive, if negative)    Tolerating PT  Tolerating Diet        Objective:     Last 24hrs VS reviewed since prior progress note.  Most recent are:    Visit Vitals  BP (!) 145/98 (BP 1 Location: Left upper arm, BP Patient Position: At rest;Lying)   Pulse 77   Temp 98.1 °F (36.7 °C)   Resp 25   Ht 5' 4\" (1.626 m)   Wt 72.1 kg (158 lb 15.2 oz)   SpO2 97%   BMI 27.28 kg/m²     SpO2 Readings from Last 6 Encounters:   22 97%            Intake/Output Summary (Last 24 hours) at 3/17/2022 0707  Last data filed at 3/16/2022 2258  Gross per 24 hour   Intake 480 ml   Output    Net 480 ml        Physical Exam:    Gen:  Well-developed, well-nourished, in no acute distress  HEENT:  Pink conjunctivae, PERRL, hearing intact to voice, moist mucous membranes  Neck:  Supple, without masses, thyroid non-tender  Resp:  No accessory muscle use, clear breath sounds without wheezes rales or rhonchi  Card:  No murmurs, normal S1, S2 without thrills, bruits or peripheral edema  Abd:  Soft, non-tender, non-distended, normoactive bowel sounds are present, no palpable organomegaly and no detectable hernias  Lymph:  No cervical or inguinal adenopathy  Musc:  No cyanosis or clubbing  Skin:  No rashes or ulcers, skin turgor is good  Neuro:  Cranial nerves are grossly intact, no focal motor weakness, follows commands appropriately  Psych:  Good insight, oriented to person, place and time, alert  __________________________________________________________________  Medications Reviewed: (see below)  Medications:     Current Facility-Administered Medications   Medication Dose Route Frequency    heparin 25,000 units in D5W 250 ml infusion  18-36 Units/kg/hr IntraVENous TITRATE    sodium chloride (NS) flush 5-40 mL  5-40 mL IntraVENous Q8H    sodium chloride (NS) flush 5-40 mL  5-40 mL IntraVENous PRN    acetaminophen (TYLENOL) tablet 650 mg  650 mg Oral Q6H PRN    Or    acetaminophen (TYLENOL) suppository 650 mg  650 mg Rectal Q6H PRN    polyethylene glycol (MIRALAX) packet 17 g  17 g Oral DAILY PRN    ondansetron (ZOFRAN ODT) tablet 4 mg  4 mg Oral Q8H PRN    Or    ondansetron (ZOFRAN) injection 4 mg  4 mg IntraVENous Q6H PRN    atenoloL (TENORMIN) tablet 100 mg  100 mg Oral DAILY    lisinopriL (PRINIVIL, ZESTRIL) tablet 20 mg  20 mg Oral DAILY        Lab Data Reviewed: (see below)  Lab Review:     Recent Labs     03/17/22  0304 03/16/22  1234 03/16/22  1000   WBC 9.8 7.9 8.0   HGB 11.8 12.6 15.0   HCT 37.4 39.8 47.4*    328 391     Recent Labs     03/17/22  0304 03/16/22  1000    138   K 3.8 3.9   * 104   CO2 21 25   * 175*   BUN 9 9   CREA 0.74 1.25*   CA 8.5 9.5   MG  --  2.1   ALB  --  4.1   TBILI  --  0.4   ALT  --  31     No results found for: GLUCPOC  No results for input(s): PH, PCO2, PO2, HCO3, FIO2 in the last 72 hours.   No results for input(s): INR, INREXT in the last 72 hours. All Micro Results     Procedure Component Value Units Date/Time    COVID-19 RAPID TEST [[id number]] Collected: 03/16/22 1057    Order Status: Completed Specimen: Nasopharyngeal Updated: 03/16/22 1134     Specimen source Nasopharyngeal        COVID-19 rapid test Not detected        Comment: Rapid Abbott ID Now       Rapid NAAT:  The specimen is NEGATIVE for SARS-CoV-2, the novel coronavirus associated with COVID-19. Negative results should be treated as presumptive and, if inconsistent with clinical signs and symptoms or necessary for patient management, should be tested with an alternative molecular assay. Negative results do not preclude SARS-CoV-2 infection and should not be used as the sole basis for patient management decisions. This test has been authorized by the FDA under an Emergency Use Authorization (EUA) for use by authorized laboratories. Fact sheet for Healthcare Providers: Errplanedate.co.nz  Fact sheet for Patients: Errplanedate.co.nz       Methodology: Isothermal Nucleic Acid Amplification         COVID-19 RAPID TEST [[id number]]  (Abnormal) Collected: 03/16/22 1000    Order Status: Completed Specimen: Nasopharyngeal Updated: 03/16/22 1050     Specimen source Nasopharyngeal        COVID-19 rapid test Indeterminate        Comment: Rapid Abbott ID Now       The presence or absence of COVID-19 Viral RNAs cannot be determined. If clinically indicated, please collect a new specimen. This test has been authorized by the FDA under an Emergency Use Authorization (EUA) for use by authorized laboratories.         Fact sheet for Healthcare Providers: ConventionRecommenddate.co.nz  Fact sheet for Patients: Errplanedate.co.nz       Methodology: Isothermal Nucleic Acid Amplification  CALLED TO AND READ BACK BY  RILEY THAKKAR AT 1050/DW         URINE CULTURE HOLD SAMPLE [[id number]] Collected: 03/16/22 1000    Order Status: Completed Specimen: Urine from Serum Updated: 03/16/22 1038     Urine culture hold       Urine on hold in Microbiology dept for 2 days. If unpreserved urine is submitted, it cannot be used for addtional testing after 24 hours, recollection will be required. I have reviewed notes of prior 24hr. Other pertinent lab: Total time spent with patient: 28 I personally reviewed chart, notes, data and current medications in the medical record. I have personally examined and treated the patient at bedside during this period.                  Care Plan discussed with: Patient, Nursing Staff and >50% of time spent in counseling and coordination of care    Discussed:  Care Plan    Prophylaxis:  Hep SQ    Disposition:  Home w/Family           ___________________________________________________    Attending Physician: River Che MD

## 2022-03-17 NOTE — PROGRESS NOTES
Cardiology Initial Care Encounter    Patient: Jing Marie MRN: [id number]     YOB: 1980  Age: 37 y.o. Sex: female      Admit Date: 3/16/2022       Assessment/Plan     1. Elevated troponin, pBNP: trop 1045 in setting of acute PE. TTE w/ nl LV function, EF 55-60%, mod-severe dilated RV with mod-severe dysfunction. pBNP 5146. Likely d/t PE, will order stress test/further ischemic w/u as OP, will arrange f/u appt     2. Acute bilat PE: w/ RV involvement, on heparin gtt. Monitor closely      3. HTN: cont lisinopril, atenolol     4. Graves disease    5. Prolonged QTc: avoid QT prolonging agents     CARDIOLOGY ATTENDING  Patient personally seen and examined. All the elements of history and examination were personally performed. Assessment and plan was discussed and agree. Ms. Gregory Dean admitted with Acute PE. No current CP. Hrt RRR, no murmur, lungs clear     EKG - NSR with anterior and inferior TWI   Echo - LVEF 65-60% and normal wall motion and grade 1 diastology. RV with mod-severe dilation and dysfunction   Hs-Trop ~ 1000    All of these findings can be explained by her significant bilat Acute PE. No additional cardiac testing needed at this time, however we will plan to recheck an echo as an outpatient in a couple of months. Can consider a treadmill stress test as outpatient as well. Marlno Montgomery MD, [facility]     Jing Marie is a 37 y.o.  female  with PMH significant for HTN, Graves disease. Pt presented to the ED due to worsening SOB, burning in her chest. Symptoms began about 1 month ago and have cont to progress, associated with dizziness/lightheadedness and dry cough. Denies any recent fever, chills. No palpitations, orthopnea or edema. Chest CTA revealed bilat PE. Denies any prior hx of blood clots, clotting disorders or using birth control pills.      ECHO 03/16/22    ECHO ADULT COMPLETE 03/16/2022 3/16/2022    Interpretation Summary   Left Ventricle: Left ventricle size is normal. Mildly increased wall thickness. Normal wall motion. Normal left ventricular systolic function with a visually estimated EF of 55 - 60%.  --- > **Reviewed by Dr. Juan Carlos Delarosa --- RV dilation/dysfunction mod to severe, with pulm HTN    The patient has been referred to cardiology for on going management of elevated troponin, pBNP. Review of Symptoms:  Constitutional: +fatigue, weakness  ENT: negative   Respiratory: +SOB  Gastrointestinal: negative  Genitourinary: dysuria, hematuria, frequency   Musculoskeletal:negative  Neurological: negative  Other systems reviewed and negative except as above. Previous cardiac hx  No specialty comments available. Risk factors: HTN    Social History     Tobacco Use    Smoking status: Not on file    Smokeless tobacco: Not on file   Substance Use Topics    Alcohol use: Not on file     No family history on file.     Current Facility-Administered Medications   Medication Dose Route Frequency    heparin 25,000 units in D5W 250 ml infusion  18-36 Units/kg/hr IntraVENous TITRATE    sodium chloride (NS) flush 5-40 mL  5-40 mL IntraVENous Q8H    sodium chloride (NS) flush 5-40 mL  5-40 mL IntraVENous PRN    acetaminophen (TYLENOL) tablet 650 mg  650 mg Oral Q6H PRN    Or    acetaminophen (TYLENOL) suppository 650 mg  650 mg Rectal Q6H PRN    polyethylene glycol (MIRALAX) packet 17 g  17 g Oral DAILY PRN    ondansetron (ZOFRAN ODT) tablet 4 mg  4 mg Oral Q8H PRN    Or    ondansetron (ZOFRAN) injection 4 mg  4 mg IntraVENous Q6H PRN    atenoloL (TENORMIN) tablet 100 mg  100 mg Oral DAILY    lisinopriL (PRINIVIL, ZESTRIL) tablet 20 mg  20 mg Oral DAILY       Objective:     Vitals:    03/17/22 0740 03/17/22 0943 03/17/22 1129 03/17/22 1531   BP: (!) 130/95 (!) 154/99 121/87 (!) 142/91   Pulse: 79 81 75 73   Resp: 25 22 18 18   Temp: 97.9 °F (36.6 °C) 97.8 °F (36.6 °C) 98.9 °F (37.2 °C) 98 °F (36.7 °C)   SpO2: 96% 97% 96% 100%   Weight: Height:            Intake and Output:  Current Shift: 03/17 0701 - 03/17 1900  In: 120 [P.O.:120]  Out: -   Last three shifts: 03/15 1901 - 03/17 0700  In: 480 [P.O.:480]  Out: -           Gen: Well-developed, well-nourished, in no acute distress  Neck: Supple,No JVD, No Carotid Bruit,   Resp: No accessory muscle use, Clear breath sounds, No rales or rhonchi  Card: Regular Rate,Rythm,Normal S1, S2, No murmurs, rubs or gallop. No thrills. Abd:  Soft, non-tender, non-distended,BS+,   MSK: No cyanosis  Skin: No rashes    Neuro: moving all four extremities , follows commands appropriately  Psych:  Good insight, oriented to person, place , alert, Nml Affect  LE: No edema    EKG:    Normal sinus rhythm   T wave abnormality, consider inferior ischemia   T wave abnormality, consider anterior ischemia   Prolonged QT           TELEMETRY SR    Lab/Data Review: All lab results for the last 24 hours reviewed.      Signed By: Epifanio Valdez NP     March 17, 2022

## 2022-03-17 NOTE — PROGRESS NOTES
TRANSFER - OUT REPORT:    Verbal report given to [facility]) on Netta Hernandez (patient name)  being transferred to [phone] (unit) for routine progression of care   Report consisted of patients Situation, Background, Assessment and   Recommendations(SBAR).     RILEY French

## 2022-03-17 NOTE — PROGRESS NOTES
1900 - Bedside and verbal shift change report given to Laura Cannon RN (oncoming nurse) by Louise Jacques RN (offgoing nurse). Report included the following information: SBAR, Kardex, Intake/Output, MAR, Recent Results, and Med Rec Status. This patient was assisted with Intentional Toileting every 2 hours during this shift as appropriate. Documentation of ambulation and output reflected on Flowsheet as appropriate. Purposeful hourly rounding was completed using AIDET and 5Ps. Outcomes of PHR documented as they occurred. Bed alarm in use as appropriate. Dual Suction and ambubag in place. 0700 - Bedside and verbal shift change report given to Louise Jacques RN (oncoming nurse) by Laura Cannon RN (offgoing nurse). Report included the following: SBAR, Kardex, Intake/Output, MAR, Recent Results, and Med Rec Status.

## 2022-03-17 NOTE — PROGRESS NOTES
0700 Bedside and Verbal shift change report given to SUSSY LIN (oncoming nurse) by Kevin Molina RN (offgoing nurse). Report included the following information SBAR, Kardex, Intake/Output, MAR, Accordion, Recent Results and Med Rec Status. This patient was assisted with Intentional Toileting every 2 hours during this shift as appropriate. Documentation of ambulation and output reflected on Flowsheet as appropriate. Purposeful hourly rounding was completed using AIDET and 5Ps. Outcomes of PHR documented as they occurred. Bed alarm in use as appropriate. Dual Suction and ambubag in place. 1645 TRANSFER - OUT REPORT:    Verbal report given to [facility] RN (name) on Layo Parekh  being transferred to [phone] (unit) for routine progression of care       Report consisted of patients Situation, Background, Assessment and   Recommendations(SBAR). Information from the following report(s) SBAR, Kardex, Intake/Output, MAR, Accordion, Recent Results, Med Rec Status and Cardiac Rhythm NSR was reviewed with the receiving nurse. Lines:   Peripheral IV 03/16/22 Left Antecubital (Active)   Site Assessment Clean, dry, & intact 03/17/22 0307   Phlebitis Assessment 0 03/17/22 0307   Infiltration Assessment 0 03/17/22 0307   Dressing Status Clean, dry, & intact 03/17/22 0307   Dressing Type Transparent 03/17/22 0307   Hub Color/Line Status Pink; Infusing;Flushed 03/17/22 6539   Action Taken Open ports on tubing capped 03/17/22 0307   Alcohol Cap Used Yes 03/17/22 0307       Peripheral IV 03/16/22 Right Antecubital (Active)   Site Assessment Clean, dry, & intact 03/17/22 0307   Phlebitis Assessment 0 03/17/22 0307   Infiltration Assessment 0 03/17/22 0307   Dressing Status Clean, dry, & intact 03/17/22 0307   Dressing Type Transparent 03/17/22 0307   Hub Color/Line Status Pink;Capped;Flushed 03/17/22 0307   Action Taken Open ports on tubing capped 03/17/22 0307   Alcohol Cap Used Yes 03/17/22 4078        Opportunity for questions and clarification was provided.       Patient transported with:   Registered Nurse

## 2022-03-18 LAB
APTT PPP: 56.1 SEC (ref 22.1–31)
APTT PPP: 63.3 SEC (ref 22.1–31)
COMMENT, HOLDF: NORMAL
SAMPLES BEING HELD,HOLD: NORMAL
THERAPEUTIC RANGE,PTTT: ABNORMAL SECS (ref 58–77)
THERAPEUTIC RANGE,PTTT: ABNORMAL SECS (ref 58–77)

## 2022-03-18 PROCEDURE — 74011250636 HC RX REV CODE- 250/636: Performed by: STUDENT IN AN ORGANIZED HEALTH CARE EDUCATION/TRAINING PROGRAM

## 2022-03-18 PROCEDURE — 96375 TX/PRO/DX INJ NEW DRUG ADDON: CPT

## 2022-03-18 PROCEDURE — 99212 OFFICE O/P EST SF 10 MIN: CPT | Performed by: SPECIALIST

## 2022-03-18 PROCEDURE — 96366 THER/PROPH/DIAG IV INF ADDON: CPT

## 2022-03-18 PROCEDURE — 74011000258 HC RX REV CODE- 258: Performed by: STUDENT IN AN ORGANIZED HEALTH CARE EDUCATION/TRAINING PROGRAM

## 2022-03-18 PROCEDURE — G0378 HOSPITAL OBSERVATION PER HR: HCPCS

## 2022-03-18 PROCEDURE — 74011250636 HC RX REV CODE- 250/636: Performed by: INTERNAL MEDICINE

## 2022-03-18 PROCEDURE — 36415 COLL VENOUS BLD VENIPUNCTURE: CPT

## 2022-03-18 PROCEDURE — 85730 THROMBOPLASTIN TIME PARTIAL: CPT

## 2022-03-18 PROCEDURE — 74011000250 HC RX REV CODE- 250: Performed by: INTERNAL MEDICINE

## 2022-03-18 PROCEDURE — 65660000000 HC RM CCU STEPDOWN

## 2022-03-18 PROCEDURE — 74011250637 HC RX REV CODE- 250/637: Performed by: INTERNAL MEDICINE

## 2022-03-18 RX ORDER — HYDRALAZINE HYDROCHLORIDE 20 MG/ML
10 INJECTION INTRAMUSCULAR; INTRAVENOUS
Status: DISCONTINUED | OUTPATIENT
Start: 2022-03-18 | End: 2022-03-19 | Stop reason: HOSPADM

## 2022-03-18 RX ORDER — HYDRALAZINE HYDROCHLORIDE 20 MG/ML
10 INJECTION INTRAMUSCULAR; INTRAVENOUS ONCE
Status: COMPLETED | OUTPATIENT
Start: 2022-03-18 | End: 2022-03-18

## 2022-03-18 RX ADMIN — ATENOLOL 100 MG: 50 TABLET ORAL at 08:32

## 2022-03-18 RX ADMIN — SODIUM CHLORIDE, PRESERVATIVE FREE 10 ML: 5 INJECTION INTRAVENOUS at 22:11

## 2022-03-18 RX ADMIN — SODIUM CHLORIDE, PRESERVATIVE FREE 10 ML: 5 INJECTION INTRAVENOUS at 03:42

## 2022-03-18 RX ADMIN — LISINOPRIL 20 MG: 20 TABLET ORAL at 08:32

## 2022-03-18 RX ADMIN — SODIUM CHLORIDE, PRESERVATIVE FREE 10 ML: 5 INJECTION INTRAVENOUS at 14:00

## 2022-03-18 RX ADMIN — HEPARIN SODIUM 19 UNITS/KG/HR: 10000 INJECTION INTRAVENOUS; SUBCUTANEOUS at 07:14

## 2022-03-18 RX ADMIN — HEPARIN SODIUM 19 UNITS/KG/HR: 10000 INJECTION INTRAVENOUS; SUBCUTANEOUS at 05:53

## 2022-03-18 RX ADMIN — HEPARIN SODIUM 20 UNITS/KG/HR: 10000 INJECTION INTRAVENOUS; SUBCUTANEOUS at 19:36

## 2022-03-18 RX ADMIN — HYDRALAZINE HYDROCHLORIDE 10 MG: 20 INJECTION INTRAMUSCULAR; INTRAVENOUS at 09:04

## 2022-03-18 NOTE — PROGRESS NOTES
Lenin Calderon MD. [facility]              Patient: Giselle Muhammad  : 1980      Today's Date: 3/18/2022        CARDIOLOGY PROGRESS NOTE  S: Still with DENIS, but no CP  O:  Physical Exam:  Visit Vitals  /89 (BP 1 Location: Left upper arm, BP Patient Position: At rest;Sitting)   Pulse 79   Temp 97.7 °F (36.5 °C)   Resp 16   Ht 5' 4\" (1.626 m)   Wt 158 lb 15.2 oz (72.1 kg)   LMP 2021   SpO2 98%   BMI 27.28 kg/m²     Patient appears generally well, mood and affect are appropriate and pleasant. HEENT:  Hearing intact, non-icteric, normocephalic, atraumatic. Neck Exam: Supple. Lung Exam: Clear to auscultation, even breath sounds. Cardiac Exam: Regular rate and rhythm with no murmur or rub  Abdomen: Soft, non-tender,    Extremities: MAW, No lower extremity edema. MSKTL: Overall good ROM ext  Skin: No significant rashes  Psych: Appropriate affect  Neuro - Grossly intact    Review of Symptoms:  Constitutional: Negative for fever   HEENT: Negative for vision changes. Respiratory: Negative for productive cough  Cardiovascular: Negative for syncope    Gastrointestinal: Negative for abdominal pain, melena  Genitourinary: Negative for dysuria  Skin: Negative for rash  Heme: No problems bleeding. Neuro - no speech changes or focal weaknesses        LABS / OTHER STUDIES reviewed:     Recent Results (from the past 24 hour(s))   TROPONIN-HIGH SENSITIVITY    Collection Time: 22  5:51 PM   Result Value Ref Range    Troponin-High Sensitivity 872 (HH) 0 - 51 ng/L   PTT    Collection Time: 22  9:46 PM   Result Value Ref Range    aPTT 51.4 (H) 22.1 - 31.0 sec    aPTT, therapeutic range     58.0 - 77.0 SECS   SAMPLES BEING HELD    Collection Time: 22  3:40 AM   Result Value Ref Range    SAMPLES BEING HELD 1pst,1lav     COMMENT        Add-on orders for these samples will be processed based on acceptable specimen integrity and analyte stability, which may vary by analyte.    PTT Collection Time: 03/18/22  3:43 AM   Result Value Ref Range    aPTT 63.3 (H) 22.1 - 31.0 sec    aPTT, therapeutic range     58.0 - 77.0 SECS   PTT    Collection Time: 03/18/22  2:30 PM   Result Value Ref Range    aPTT 56.1 (H) 22.1 - 31.0 sec    aPTT, therapeutic range     58.0 - 77.0 SECS           ASSESSMENT AND PLAN:     Assessment and Plan:    1) Acute PE  - her findings (abnormal EKG, abnormal echo, and elevated troponin) are all likely related to her significant PE  - LV function and wall motion was normal   - We will repeat an echo as outpatient and can consider a stress test when she has recovered from her acute PE    2) OK to [place] home       Lew Torres MD, [address]  [address]  Ph: [phone]   Ph [phone]

## 2022-03-18 NOTE — PROGRESS NOTES
[address] March 18, 2022       RE: Alexei Sun      To Whom It May Concern,    This is to certify that Alexei Sun was hospitalized at [address] [place] [address] on 3/16/2022 and still in the hospital under treatment. Desk work I okay. Please feel free to contact my office if you have any questions or concerns. Thank you for your assistance in this matter.       Sincerely,  Ford Schofield MD  [phone]

## 2022-03-18 NOTE — PROGRESS NOTES
Patient received with high BP this am. Repeated to verify. Diastolic is more concerning at this time. Gave morning meds for BP. Notified MD regarding BP. Ordered one time dose of hydralazine. Gave med. BP, especially diastolic remains elevated in the low 100's. Reached out to Dr. Edith Casey again to notify him of current outcome despite interventions. Awaiting further instructions. Patient remains on tele and pulse oximetry being monitored remotely. Heparin drip continues to run.      0945  Spoke to Dr. Edith Casey about ongoing high BP. Stated to monitor patient and call him in 30 mins to an hour with continual high BP for further orders. Wants to wait due to other BP meds given this am, giving them more time to be effective. Will continue to monitor.     Litzy Colin [place]

## 2022-03-18 NOTE — PROGRESS NOTES
3/18/2022   CARE MANAGEMENT NOTE:  CM reviewed EMR. Pt was admitted for PE. Reportedly, pt resides with her significant other Abhay Romo ([phone]).    RUR 6%     Transition Plan of Care:  1. Plan is for pt to return home  2. Outpt f/u  3. Pt will arrange her own transport home     CM will continue to follow pt until discharged.   Sabrina

## 2022-03-18 NOTE — PROGRESS NOTES
Bedside, Verbal and Written shift change report given to Nilson Patel (oncoming nurse) by Kevin Morris (offgoing nurse). Report included the following information SBAR, Kardex, ED Summary, Intake/Output, MAR, Recent Results and Med Rec Status.

## 2022-03-18 NOTE — PROGRESS NOTES
Problem: Deep Venous Thrombosis - Risk of  Goal: *Absence of deep venous thrombosis signs and symptoms(Stroke Metric)  Outcome: Progressing Towards Goal  Goal: *Absence of impaired coagulation signs and symptoms  Outcome: Progressing Towards Goal  Goal: *Knowledge of prescribed medications  Outcome: Progressing Towards Goal  Goal: *Absence of bleeding  Outcome: Progressing Towards Goal     Problem: Patient Education: Go to Patient Education Activity  Goal: Patient/Family Education  Outcome: Progressing Towards Goal

## 2022-03-18 NOTE — PROGRESS NOTES
Ron [facility]  [address] [facility], [address] [place] [address]  [phone]      Medical Progress Note      NAME: Lokesh Langford   :  1980  MRM:  [id number]    Date of service: 3/18/2022  7:07 AM       Assessment and Plan:   1.  Pulmonary emboli ([address] (3/16/2022).   CT of the chest: bilateral pulmonary emboli.  Continue heparin drip transition to JOAN. Echocardiogram: Right ventricle is mildly dilated. Normal wall thickness. Global hypokinesis present. Mildly reduced systolic function. Tricuspid Valve: Moderately elevated RVSP. bilateral leg Doppler is negative for DVT.              2.  Exertional SOB/ elevated proBNP.  This is most likely secondary to above.  echocardiogram as above. Cardiology evaluated pt and will have outpatient FU              3.  HTN.  Resume home atenolol and lisinopril           4. Elevated troponin. Denies chest pain. Likely type 2 MI due to PE. Evaluated by cardiology. Echocardiogram as above. Plan to repeat echo in 2 months and stress test as outpatient. Subjective:     Chief Complaint[de-identified] Patient was seen and examined as a follow up for BL PE. Chart was reviewed. still c/o exertional dyspnea. ROS:  (bold if positive, if negative)    Tolerating PT  Tolerating Diet        Objective:     Last 24hrs VS reviewed since prior progress note.  Most recent are:    Visit Vitals  BP (!) 137/95 (BP 1 Location: Left upper arm, BP Patient Position: At rest)   Pulse 73   Temp 97.6 °F (36.4 °C)   Resp 17   Ht 5' 4\" (1.626 m)   Wt 72.1 kg (158 lb 15.2 oz)   SpO2 100%   BMI 27.28 kg/m²     SpO2 Readings from Last 6 Encounters:   22 100%            Intake/Output Summary (Last 24 hours) at 3/18/2022 0724  Last data filed at 3/17/2022 1354  Gross per 24 hour   Intake 120 ml   Output    Net 120 ml        Physical Exam:    Gen:  Well-developed, well-nourished, in no acute distress  HEENT:  Pink conjunctivae, PERRL, hearing intact to voice, moist mucous membranes  Neck:  Supple, without masses, thyroid non-tender  Resp:  No accessory muscle use, clear breath sounds without wheezes rales or rhonchi  Card:  No murmurs, normal S1, S2 without thrills, bruits or peripheral edema  Abd:  Soft, non-tender, non-distended, normoactive bowel sounds are present, no palpable organomegaly and no detectable hernias  Lymph:  No cervical or inguinal adenopathy  Musc:  No cyanosis or clubbing  Skin:  No rashes or ulcers, skin turgor is good  Neuro:  Cranial nerves are grossly intact, no focal motor weakness, follows commands appropriately  Psych:  Good insight, oriented to person, place and time, alert  __________________________________________________________________  Medications Reviewed: (see below)  Medications:     Current Facility-Administered Medications   Medication Dose Route Frequency    heparin 25,000 units in D5W 250 ml infusion  18-36 Units/kg/hr IntraVENous TITRATE    sodium chloride (NS) flush 5-40 mL  5-40 mL IntraVENous Q8H    sodium chloride (NS) flush 5-40 mL  5-40 mL IntraVENous PRN    acetaminophen (TYLENOL) tablet 650 mg  650 mg Oral Q6H PRN    Or    acetaminophen (TYLENOL) suppository 650 mg  650 mg Rectal Q6H PRN    polyethylene glycol (MIRALAX) packet 17 g  17 g Oral DAILY PRN    ondansetron (ZOFRAN ODT) tablet 4 mg  4 mg Oral Q8H PRN    Or    ondansetron (ZOFRAN) injection 4 mg  4 mg IntraVENous Q6H PRN    atenoloL (TENORMIN) tablet 100 mg  100 mg Oral DAILY    lisinopriL (PRINIVIL, ZESTRIL) tablet 20 mg  20 mg Oral DAILY        Lab Data Reviewed: (see below)  Lab Review:     Recent Labs     03/17/22  0304 03/16/22  1234 03/16/22  1000   WBC 9.8 7.9 8.0   HGB 11.8 12.6 15.0   HCT 37.4 39.8 47.4*    328 391     Recent Labs     03/17/22  0304 03/16/22  1000    138   K 3.8 3.9   * 104   CO2 21 25   * 175*   BUN 9 9   CREA 0.74 1.25*   CA 8.5 9.5   MG  --  2.1   ALB  --  4.1   TBILI  --  0.4   ALT  --  31     No results found for: GLUCPOC  No results for input(s): PH, PCO2, PO2, HCO3, FIO2 in the last 72 hours. No results for input(s): INR, INREXT, INREXT in the last 72 hours. All Micro Results     Procedure Component Value Units Date/Time    COVID-19 RAPID TEST [[id number]] Collected: 03/16/22 1057    Order Status: Completed Specimen: Nasopharyngeal Updated: 03/16/22 1134     Specimen source Nasopharyngeal        COVID-19 rapid test Not detected        Comment: Rapid Abbott ID Now       Rapid NAAT:  The specimen is NEGATIVE for SARS-CoV-2, the novel coronavirus associated with COVID-19. Negative results should be treated as presumptive and, if inconsistent with clinical signs and symptoms or necessary for patient management, should be tested with an alternative molecular assay. Negative results do not preclude SARS-CoV-2 infection and should not be used as the sole basis for patient management decisions. This test has been authorized by the FDA under an Emergency Use Authorization (EUA) for use by authorized laboratories. Fact sheet for Healthcare Providers: Only Mallorcadate.co.nz  Fact sheet for Patients: Only MallorcadaUber.co.nz       Methodology: Isothermal Nucleic Acid Amplification         COVID-19 RAPID TEST [[id number]]  (Abnormal) Collected: 03/16/22 1000    Order Status: Completed Specimen: Nasopharyngeal Updated: 03/16/22 1050     Specimen source Nasopharyngeal        COVID-19 rapid test Indeterminate        Comment: Rapid Abbott ID Now       The presence or absence of COVID-19 Viral RNAs cannot be determined. If clinically indicated, please collect a new specimen. This test has been authorized by the FDA under an Emergency Use Authorization (EUA) for use by authorized laboratories.         Fact sheet for Healthcare Providers: ConventionJaschadate.co.nz  Fact sheet for Patients: Only Mallorcadate.co.nz       Methodology: Isothermal Nucleic Acid Amplification  CALLED TO AND READ BACK BY  ARIANE, RN AT 1050/DW         URINE CULTURE HOLD SAMPLE [[id number]] Collected: 03/16/22 1000    Order Status: Completed Specimen: Urine from Serum Updated: 03/16/22 1038     Urine culture hold       Urine on hold in Microbiology dept for 2 days. If unpreserved urine is submitted, it cannot be used for addtional testing after 24 hours, recollection will be required. I have reviewed notes of prior 24hr. Other pertinent lab: Total time spent with patient: 28 I personally reviewed chart, notes, data and current medications in the medical record. I have personally examined and treated the patient at bedside during this period.                  Care Plan discussed with: Patient, Nursing Staff and >50% of time spent in counseling and coordination of care    Discussed:  Care Plan    Prophylaxis:  Hep SQ    Disposition:  Home w/Family           ___________________________________________________    Attending Physician: Olive Clay MD

## 2022-03-19 VITALS
SYSTOLIC BLOOD PRESSURE: 139 MMHG | RESPIRATION RATE: 17 BRPM | OXYGEN SATURATION: 98 % | WEIGHT: 158.95 LBS | HEIGHT: 64 IN | HEART RATE: 80 BPM | TEMPERATURE: 97.9 F | BODY MASS INDEX: 27.14 KG/M2 | DIASTOLIC BLOOD PRESSURE: 93 MMHG

## 2022-03-19 LAB
APTT PPP: 67.2 SEC (ref 22.1–31)
APTT PPP: 71.3 SEC (ref 22.1–31)
THERAPEUTIC RANGE,PTTT: ABNORMAL SECS (ref 58–77)
THERAPEUTIC RANGE,PTTT: ABNORMAL SECS (ref 58–77)

## 2022-03-19 PROCEDURE — 36415 COLL VENOUS BLD VENIPUNCTURE: CPT

## 2022-03-19 PROCEDURE — 74011000258 HC RX REV CODE- 258: Performed by: STUDENT IN AN ORGANIZED HEALTH CARE EDUCATION/TRAINING PROGRAM

## 2022-03-19 PROCEDURE — G0378 HOSPITAL OBSERVATION PER HR: HCPCS

## 2022-03-19 PROCEDURE — 74011250637 HC RX REV CODE- 250/637: Performed by: INTERNAL MEDICINE

## 2022-03-19 PROCEDURE — 74011000250 HC RX REV CODE- 250: Performed by: INTERNAL MEDICINE

## 2022-03-19 PROCEDURE — 96366 THER/PROPH/DIAG IV INF ADDON: CPT

## 2022-03-19 PROCEDURE — 85730 THROMBOPLASTIN TIME PARTIAL: CPT

## 2022-03-19 PROCEDURE — 74011250636 HC RX REV CODE- 250/636: Performed by: STUDENT IN AN ORGANIZED HEALTH CARE EDUCATION/TRAINING PROGRAM

## 2022-03-19 PROCEDURE — 74011250637 HC RX REV CODE- 250/637: Performed by: HOSPITALIST

## 2022-03-19 RX ORDER — APIXABAN 5 MG (74)
KIT ORAL
Qty: 1 DOSE PACK | Refills: 0 | Status: SHIPPED | OUTPATIENT
Start: 2022-03-19 | End: 2022-05-05 | Stop reason: ALTCHOICE

## 2022-03-19 RX ORDER — APIXABAN 5 MG (74)
KIT ORAL
Qty: 1 DOSE PACK | Refills: 0 | Status: SHIPPED | OUTPATIENT
Start: 2022-03-19 | End: 2022-03-19 | Stop reason: SDUPTHER

## 2022-03-19 RX ADMIN — LISINOPRIL 20 MG: 20 TABLET ORAL at 08:54

## 2022-03-19 RX ADMIN — ATENOLOL 100 MG: 50 TABLET ORAL at 08:54

## 2022-03-19 RX ADMIN — APIXABAN 10 MG: 5 TABLET, FILM COATED ORAL at 12:01

## 2022-03-19 RX ADMIN — HEPARIN SODIUM 20 UNITS/KG/HR: 10000 INJECTION INTRAVENOUS; SUBCUTANEOUS at 00:01

## 2022-03-19 RX ADMIN — SODIUM CHLORIDE, PRESERVATIVE FREE 10 ML: 5 INJECTION INTRAVENOUS at 00:43

## 2022-03-19 NOTE — PROGRESS NOTES
3/19/2022  11:55 AM    Care Management Progress Note      ICD-10-CM ICD-9-CM    1. Other acute pulmonary embolism with acute cor pulmonale (HCC)  I26.09 415.19      415.0    2. SOB (shortness of breath)  R06.02 786.05    3. Tachypnea  R06.82 786.06        RUR:  6%  Risk Level: [x]Low []Moderate []High  Value-based purchasing: [] Yes [x] No  Bundle patient: [] Yes [x] No   Specify:     Transition of care plan:  1. Medically stable with discharge order  2. Home with family assistance  3. Outpatient follow-up. 4. Pt's partner to transport  5. No further CM needs identified    Care Management Interventions  PCP Verified by CM:  Yes (Dr. Villagomez Plush )  Support Systems: Spouse/Significant Other  Confirm Follow Up Transport: Family  The Plan for Transition of Care is Related to the Following Treatment Goals : PE  Discharge Location  Patient Expects to be Discharged to[de-identified] Home with family assistance    Esvin Zavala RN

## 2022-03-19 NOTE — DISCHARGE SUMMARY
[address] SUMMARY    Name:  Lázaro Hodges  MR#:  [id number]  :  1980  ACCOUNT #:  [de-identified]  ADMIT DATE:  2022  DISCHARGE DATE:  2022    ADMISSION DIAGNOSES:  Shortness of breath due to bilateral pulmonary emboli. DISCHARGE DIAGNOSES:  Shortness of breath due to bilateral pulmonary emboli. HOSPITAL COURSE:      The patient is a 81-year-old -American female, who presented to the emergency room with progressive shortness of breath, which started four weeks ago. On the day of admission, her shortness of breath was worse. The patient has previous history significant for hypertension and Graves disease. In the emergency room, further workup included CT scan of the chest with and without contrast, which showed significant bilateral pulmonary emboli. The patient was then started on heparin drip and due to elevated troponin, the patient was also seen by Cardiology, Dr. Jimi Campos. An echocardiogram was done, which showed LV function was normal rate with no wall motion abnormality. He recommended doing a followup with him and cleared for discharge. Today, the patient's shortness of breath is better. She is  eager to go home. She walked in the hallway without getting any significant shortness of breath. Her Doppler studies were negative. The patient is stable. The patient will be discharged home on Eliquis starter pack 10 mg b.i.d. for 7 days and then 5 mg twice daily she will need at least 6 months of anti coagulation. The patient is supposed to follow up with primary care physician and hematologist as an outpatient and Cardiology. Dc meds    Eliquis 10 mg BID for 7 days then 5 mg BID  6 months   Resume home medications Atenolol and Lisinopril    Patient told if her SOB gets worse to come back to Er Immediately     CONDITION ON DISCHARGE:  Stable.       MD HARVEY [email or web address]  D:  2022 10:40  T: 03/19/2022 11:30  JOB #:  [id number]

## 2022-03-19 NOTE — PROGRESS NOTES
Problem: Deep Venous Thrombosis - Risk of  Goal: *Absence of deep venous thrombosis signs and symptoms(Stroke Metric)  3/18/2022 2238 by Marily Velazquez  Outcome: Progressing Towards Goal  3/18/2022 2024 by Marily Cabrera  Outcome: Progressing Towards Goal  Goal: *Absence of impaired coagulation signs and symptoms  3/18/2022 2238 by Marily Cabrera  Outcome: Progressing Towards Goal  3/18/2022 2024 by Marily Cabrera  Outcome: Progressing Towards Goal  Goal: *Knowledge of prescribed medications  3/18/2022 2238 by Marily Cabrera  Outcome: Progressing Towards Goal  3/18/2022 2024 by Marily Cabrera  Outcome: Progressing Towards Goal  Goal: *Absence of bleeding  3/18/2022 2238 by Marily Cabrera  Outcome: Progressing Towards Goal  3/18/2022 2024 by Marily Cabrera  Outcome: Progressing Towards Goal     Problem: Falls - Risk of  Goal: *Absence of Falls  Description: Document Darcy Embs Fall Risk and appropriate interventions in the flowsheet.   3/18/2022 2238 by Ilana Woodall  Outcome: Progressing Towards Goal  Note: Fall Risk Interventions:            Medication Interventions: Teach patient to arise slowly    Elimination Interventions: Call light in reach           3/18/2022 2024 by Marily Cabrera  Outcome: Progressing Towards Goal  Note: Fall Risk Interventions:            Medication Interventions: Patient to call before getting OOB    Elimination Interventions: Call light in reach              Problem: Patient Education: Go to Patient Education Activity  Goal: Patient/Family Education  3/18/2022 2238 by Ilana Woodall  Outcome: Progressing Towards Goal  3/18/2022 2024 by Marily Cabrera  Outcome: Progressing Towards Goal

## 2022-03-19 NOTE — PROGRESS NOTES
Problem: Deep Venous Thrombosis - Risk of  Goal: *Absence of deep venous thrombosis signs and symptoms(Stroke Metric)  Outcome: Resolved/Met  Goal: *Absence of impaired coagulation signs and symptoms  Outcome: Resolved/Met  Goal: *Knowledge of prescribed medications  Outcome: Resolved/Met  Goal: *Absence of bleeding  Outcome: Resolved/Met     Problem: Patient Education: Go to Patient Education Activity  Goal: Patient/Family Education  Outcome: Resolved/Met     Problem: Falls - Risk of  Goal: *Absence of Falls  Description: Document Jaret Fall Risk and appropriate interventions in the flowsheet.   Outcome: Resolved/Met  Note: Fall Risk Interventions:            Medication Interventions: Evaluate medications/consider consulting pharmacy,Patient to call before getting OOB,Teach patient to arise slowly    Elimination Interventions: Call light in reach              Problem: Patient Education: Go to Patient Education Activity  Goal: Patient/Family Education  Outcome: Resolved/Met

## 2022-03-19 NOTE — ROUTINE PROCESS
Pt discharged per order. Written and verbal discharge instructions given. Pt educated on medications, importance of follow up, and when to return to ED. Written prescription given and eliquis dose administered before discharge per MD request. Both IV catheters removed intact and without redness or edema. Pt vehicle via wheelchair. No distress noted and all personal belongings at side.

## 2022-03-19 NOTE — PROGRESS NOTES
Problem: Deep Venous Thrombosis - Risk of  Goal: *Absence of deep venous thrombosis signs and symptoms(Stroke Metric)  Outcome: Progressing Towards Goal  Goal: *Absence of impaired coagulation signs and symptoms  Outcome: Progressing Towards Goal  Goal: *Knowledge of prescribed medications  Outcome: Progressing Towards Goal  Goal: *Absence of bleeding  Outcome: Progressing Towards Goal     Problem: Patient Education: Go to Patient Education Activity  Goal: Patient/Family Education  Outcome: Progressing Towards Goal     Problem: Falls - Risk of  Goal: *Absence of Falls  Description: Document Jaret Fall Risk and appropriate interventions in the flowsheet.   Outcome: Progressing Towards Goal  Note: Fall Risk Interventions:            Medication Interventions: Patient to call before getting OOB    Elimination Interventions: Call light in reach

## 2022-03-19 NOTE — PROGRESS NOTES
Bedside, Verbal and Written shift change report given to [address] (oncoming nurse) by Philomena Hager RN (offgoing nurse). Report included the following information SBAR, Kardex, ED Summary, Procedure Summary, Intake/Output, MAR, Recent Results and Med Rec Status.

## 2022-03-19 NOTE — DISCHARGE INSTRUCTIONS
Discharge Instructions       PATIENT ID: Ros Martin  MRN: [id number]   YOB: 1980    DATE OF ADMISSION: 3/16/2022  9:24 AM    DATE OF DISCHARGE: 3/19/2022    PRIMARY CARE PROVIDER: Maranda Farfan MD     ATTENDING PHYSICIAN: Adonna Apgar, MD  DISCHARGING PROVIDER: Ina Casey MD    To contact this individual call [phone] and ask the  to page. If unavailable ask to be transferred the Adult Hospitalist Department. DISCHARGE DIAGNOSES and CARE RECOMMENDATIONS: PE          DIET: Regular Diet      ACTIVITY: Ambulate in house            PENDING TEST RESULTS:       FOLLOW UP APPOINTMENTS:   Follow-up Information     Follow up With Specialties Details Why Contact Info    Ancelmo Beltran MD Cardiology On 2022 8:40 am [address] [facility] [address]      Maranda Farfan, [address]  [phone]           Hematology in 2-3 weeks      YOU WERE SEEN BY THE FOLLOWING CONSULTATIONS:   IP CONSULT TO CARDIOLOGY  IP CONSULT TO CARDIOLOGY    YOU HAD THE FOLLOWING PROCEDURES/SURGERIES  :   * No surgery found *              DISCHARGE MEDICATIONS:***   See Medication Reconciliation Form    · It is important that you take the medication exactly as they are prescribed. · Keep your medication in the bottles provided by the pharmacist and keep a list of the medication names, dosages, and times to be taken in your wallet. · Do not take other medications without consulting your doctor. NOTIFY YOUR PHYSICIAN FOR ANY OF THE FOLLOWING: ***  Fever over 101 degrees for 24 hours. Chest pain, shortness of breath, fever, chills, nausea, vomiting, diarrhea, change in mentation, falling, weakness, bleeding. Severe pain or pain not relieved by medications. Or, any other signs or symptoms that you may have questions about.       Services you need on discharge***     HOME HEALTH NURSE: MEDICATIONS CHECK,WOUND CARE ETC...     [address] VISIT     DISPATCH HEALTH          Signed:   Chery Pabon MD  3/19/2022  10:34 AM

## 2022-03-24 PROBLEM — I26.99 PULMONARY EMBOLI (HCC): Status: ACTIVE | Noted: 2022-03-16

## 2022-04-07 ENCOUNTER — OFFICE VISIT (OUTPATIENT)
Dept: CARDIOLOGY CLINIC | Age: 42
End: 2022-04-07
Payer: COMMERCIAL

## 2022-04-07 ENCOUNTER — TELEPHONE (OUTPATIENT)
Dept: ONCOLOGY | Age: 42
End: 2022-04-07

## 2022-04-07 VITALS
HEART RATE: 73 BPM | HEIGHT: 64 IN | WEIGHT: 160 LBS | OXYGEN SATURATION: 98 % | SYSTOLIC BLOOD PRESSURE: 130 MMHG | BODY MASS INDEX: 27.31 KG/M2 | DIASTOLIC BLOOD PRESSURE: 80 MMHG

## 2022-04-07 DIAGNOSIS — I26.99 PULMONARY EMBOLISM, UNSPECIFIED CHRONICITY, UNSPECIFIED PULMONARY EMBOLISM TYPE, UNSPECIFIED WHETHER ACUTE COR PULMONALE PRESENT (HCC): Primary | ICD-10-CM

## 2022-04-07 DIAGNOSIS — R94.31 ABNORMAL EKG: ICD-10-CM

## 2022-04-07 PROCEDURE — 99214 OFFICE O/P EST MOD 30 MIN: CPT | Performed by: SPECIALIST

## 2022-04-07 NOTE — TELEPHONE ENCOUNTER
Called patient to set up NP appt. No answer.  Left     NP / Pulmonary embolism, / Dr. Yassine Bailey

## 2022-04-07 NOTE — PROGRESS NOTES
Marta Burks MD. [facility]              Patient: Sly Pinto  : 1980      Today's Date: 2022          HISTORY OF PRESENT ILLNESS:     History of Present Illness:  She is 75% back to normal - feels much better overall. Breathing better. No CP. No complaints. PAST MEDICAL HISTORY:     Past Medical History:   Diagnosis Date    Acute pulmonary embolism ([address]     Chest CTA 3/16/22 - Fairly significant bilateral pulmonary emboli. Gary Pretty' disease     HTN (hypertension)            MEDICATIONS:     Current Outpatient Medications   Medication Sig Dispense Refill    apixaban (Eliquis DVT-PE Treat 30D Start) 5 mg (74 tabs) starter pack Take 10 mg (two 5 mg tablets) by mouth twice a day for 7 days   Followed by 5 mg (one 5 mg tablet) by mouth twice a day 1 Dose Pack 0    atenoloL (TENORMIN) 100 mg tablet Take 100 mg by mouth daily.  lisinopriL (PRINIVIL, ZESTRIL) 20 mg tablet Take 20 mg by mouth daily. Indications: high blood pressure             SOCIAL HISTORY:     Social History     Tobacco Use    Smoking status: Never Smoker    Smokeless tobacco: Never Used   Substance Use Topics    Alcohol use: Not on file    Drug use: Not on file         FAMILY HISTORY:     Family History   Problem Relation Age of Onset    Hypertension Mother            REVIEW OF SYMPTOMS:     Review of Symptoms:  Constitutional: Negative for fever, chills  HEENT: Negative for nosebleeds, tinnitus, and vision changes. Respiratory: Negative for cough, wheezing  Cardiovascular: Negative for orthopnea, claudication, syncope, and PND. Gastrointestinal: Negative for abdominal pain, diarrhea, melena. Genitourinary: Negative for dysuria  Musculoskeletal: Negative for myalgias. Skin: Negative for rash  Heme: No problems bleeding. Neurological: Negative for speech change and focal weakness.            PHYSICAL EXAM:     Physical Exam:  Visit Vitals  /80 (BP 1 Location: Left upper arm, BP Patient Position: Sitting, BP Cuff Size: Adult)   Pulse 73   Ht 5' 4\" (1.626 m)   Wt 160 lb (72.6 kg)   LMP 11/22/2021   SpO2 98%   BMI 27.46 kg/m²     Patient appears generally well, mood and affect are appropriate and pleasant. HEENT:  Hearing intact, non-icteric, normocephalic, atraumatic. Neck Exam: Supple   Lung Exam: Clear to auscultation, even breath sounds. Cardiac Exam: Regular rate and rhythm with no murmur or rub  Abdomen: Soft, non-tender  Extremities: Moves all ext well. No lower extremity edema. MSKTL: Overall good ROM ext  Skin: No significant rashes  Psych: Appropriate affect  Neuro - Grossly intact        LABS / OTHER STUDIES reviewed:       Lab Results   Component Value Date/Time    Sodium 140 03/17/2022 03:04 AM    Potassium 3.8 03/17/2022 03:04 AM    Chloride 110 (H) 03/17/2022 03:04 AM    CO2 21 03/17/2022 03:04 AM    Anion gap 9 03/17/2022 03:04 AM    Glucose 147 (H) 03/17/2022 03:04 AM    BUN 9 03/17/2022 03:04 AM    Creatinine 0.74 03/17/2022 03:04 AM    BUN/Creatinine ratio 12 03/17/2022 03:04 AM    GFR est AA >60 03/17/2022 03:04 AM    GFR est non-AA >60 03/17/2022 03:04 AM    Calcium 8.5 03/17/2022 03:04 AM    Bilirubin, total 0.4 03/16/2022 10:00 AM    Alk. phosphatase 76 03/16/2022 10:00 AM    Protein, total 9.1 (H) 03/16/2022 10:00 AM    Albumin 4.1 03/16/2022 10:00 AM    Globulin 5.0 (H) 03/16/2022 10:00 AM    A-G Ratio 0.8 (L) 03/16/2022 10:00 AM    ALT (SGPT) 31 03/16/2022 10:00 AM    AST (SGOT) 14 (L) 03/16/2022 10:00 AM     Lab Results   Component Value Date/Time    WBC 9.8 03/17/2022 03:04 AM    HGB 11.8 03/17/2022 03:04 AM    HCT 37.4 03/17/2022 03:04 AM    PLATELET 319 27/36/1423 03:04 AM    MCV 85.4 03/17/2022 03:04 AM           CARDIAC DIAGNOSTICS:     Cardiac Evaluation Includes:  I reviewed the results below. EKG 3/16/22 - NSR with anterior and inferior TWI     Echo 3/16/22 - LVEF 65-60% and normal wall motion and grade 1 diastology.  RV with mod-severe dilation and dysfunction     Chest CTA 3/16/22 - Fairly significant bilateral pulmonary emboli. ASSESSMENT AND PLAN:     Assessment and Plan:    1) Acute PE  - Chest CTA 3/16/22 - Fairly significant bilateral pulmonary emboli. - EKG 3/16/22 - NSR with anterior and inferior TWI   - Echo 3/16/22 - LVEF 65-60% and normal wall motion and grade 1 diastology. RV with mod-severe dilation and dysfunction   - Hs-Trop ~ 1000  - Will allow more time for her to recover from an acute PE and then plan to check an echo and treadmill stress test in 3 months   - It seems her PE was probably unprovoked ---. Will refer to hematology for further hematological workup and get their thoughts on length of [address]     2) HTN / tachycardia   - She says atenolol was started for tachycardia associated with Graves Disease (started 15+ years ago)  - BP OK ---> cont meds     3) Phone FU. See me PRN. Griselda Mowers, MD, [address]  [address] [place] [address], [facility] N. Jerardo Paiz.  Т[place], [address] [place] [address]  Ph: [phone]   Ph [phone]      ADDENDUM   7/7/2022    Echo 7/7/22 - LVEF 61%  Treadmill Stress Test 7/7/22 - walked 7 min (10 METS), normal study     Will send a message

## 2022-04-07 NOTE — PROGRESS NOTES
Sandra Collins is a 37 y.o. female    Visit Vitals  /80 (BP 1 Location: Left upper arm, BP Patient Position: Sitting, BP Cuff Size: Adult)   Pulse 73   Ht 5' 4\" (1.626 m)   Wt 160 lb (72.6 kg)   LMP 11/22/2021   SpO2 98%   BMI 27.46 kg/m²       Chief Complaint   Patient presents with    New Patient       Chest pain no  SOB no  Dizziness no  Swelling no  Recent hospital visit [address], PULMONARY EMBOLISM.    Refills no  COVID VACCINE STATUS yes  HAD COVID? no

## 2022-04-27 NOTE — PROGRESS NOTES
[facility] at [address], [address] [facility]  Liu Ledger: [phone]  F: [phone]      Reason for Visit:   Loan Ortiz is a 37 y.o. female who is seen in consultation at the request of Dr. Iraj Villeda for evaluation of pulmonary embolism. History of Present Illness:   Loan Ortiz is a pleasant 37 y.o. female who was seen for evaluation of pulmonary embolism. She presented to the ED in 3/2022 with several months of progressive dyspnea and fatigue. No chest pain. Some palpitations, but that has been a chronic problem for her. A CTA confirmed pulmonary embolism. She was admitted and started on a heparin drip due to elevated troponin. She was evaluated by cardiology. Switched to apixaban for discharge, which she remains on currently. She reports feeling well since discharge. She is tolerating the apixaban well. Breathing has been improving, though not back to baseline. She denies any bleeding. She denies risk factors for VTE in the past year. No surgeries, hospitalizations, trauma, immobilization, hormone therapy, or long travel. No family history of VTE. Past Medical History:   Diagnosis Date    Acute pulmonary embolism ([address]     Chest CTA 3/16/22 - Fairly significant bilateral pulmonary emboli.  Graves' disease     HTN (hypertension)        History reviewed. No pertinent surgical history.     Social History     Socioeconomic History    Marital status: SINGLE     Spouse name: Not on file    Number of children: Not on file    Years of education: Not on file    Highest education level: Not on file   Occupational History    Not on file   Tobacco Use    Smoking status: Never Smoker    Smokeless tobacco: Never Used   Substance and Sexual Activity    Alcohol use: Not on file    Drug use: Not on file    Sexual activity: Not on file   Other Topics Concern    Not on file   Social History Narrative    Not on file     Social Determinants of Health     Financial Resource Strain:     Difficulty of Paying Living Expenses: Not on file   Food Insecurity:     Worried About Running Out of Food in the Last Year: Not on file    Jhony of Food in the Last Year: Not on file   Transportation Needs:     Lack of Transportation (Medical): Not on file    Lack of Transportation (Non-Medical): Not on file   Physical Activity:     Days of Exercise per Week: Not on file    Minutes of Exercise per Session: Not on file   Stress:     Feeling of Stress : Not on file   Social Connections:     Frequency of Communication with Friends and Family: Not on file    Frequency of Social Gatherings with Friends and Family: Not on file    Attends Rastafarian Services: Not on file    Active Member of [address] [place] [address] [employer] or Organizations: Not on file    Attends Club or Organization Meetings: Not on file    Marital Status: Not on file   Intimate Partner Violence:     Fear of Current or Ex-Partner: Not on file    Emotionally Abused: Not on file    Physically Abused: Not on file    Sexually Abused: Not on file   Housing Stability:     Unable to Pay for Housing in the Last Year: Not on file    Number of [place] in the Last Year: Not on file    Unstable Housing in the Last Year: Not on file       Family History   Problem Relation Age of Onset    Hypertension Mother        Current Outpatient Medications   Medication Sig    Eliquis 5 mg tablet     acetaminophen (TYLENOL) 325 mg tablet Take  by mouth every four (4) hours as needed for Pain.  atenoloL (TENORMIN) 100 mg tablet Take 100 mg by mouth daily.  lisinopriL (PRINIVIL, ZESTRIL) 20 mg tablet Take 20 mg by mouth daily. Indications: high blood pressure     No current facility-administered medications for this visit. No Known Allergies       Review of Systems: A complete review of systems was obtained, reviewed. Pertinent findings reviewed above.     Physical Exam:     Visit Vitals  BP (!) 179/95 (BP 1 Location: Left upper arm, BP Patient Position: Sitting, BP Cuff Size: Large adult) Comment: . Pulse 63   Temp 97.5 °F (36.4 °C) (Temporal)   Resp 16   Ht 5' 4\" (1.626 m)   Wt 161 lb (73 kg)   LMP 11/22/2021   SpO2 99%   BMI 27.64 kg/m²     General: no distress  Eyes: PERRLA, anicteric sclerae  HENT: atraumatic, oropharynx clear  Neck: supple  Lymphatic: no cervical, supraclavicular, or inguinal adenopathy  Respiratory: CTAB, normal respiratory effort  CV: normal rate, regular rhythm, no murmurs, no peripheral edema  GI: soft, nontender, nondistended, no masses, no hepatomegaly, no splenomegaly  MS: digits without clubbing or cyanosis. Skin: no rashes, no ecchymoses, no petechia. normal temperature, turgor, and texture. Psych: alert, oriented, appropriate affect, normal judgment/insight    Results:     Lab Results   Component Value Date/Time    WBC 9.8 03/17/2022 03:04 AM    HGB 11.8 03/17/2022 03:04 AM    HCT 37.4 03/17/2022 03:04 AM    PLATELET 820 30/57/0269 03:04 AM    MCV 85.4 03/17/2022 03:04 AM    ABS. NEUTROPHILS 4.1 03/16/2022 12:34 PM     Lab Results   Component Value Date/Time    Sodium 140 03/17/2022 03:04 AM    Potassium 3.8 03/17/2022 03:04 AM    Chloride 110 (H) 03/17/2022 03:04 AM    CO2 21 03/17/2022 03:04 AM    Glucose 147 (H) 03/17/2022 03:04 AM    BUN 9 03/17/2022 03:04 AM    Creatinine 0.74 03/17/2022 03:04 AM    GFR est AA >60 03/17/2022 03:04 AM    GFR est non-AA >60 03/17/2022 03:04 AM    Calcium 8.5 03/17/2022 03:04 AM     Lab Results   Component Value Date/Time    Bilirubin, total 0.4 03/16/2022 10:00 AM    ALT (SGPT) 31 03/16/2022 10:00 AM    Alk. phosphatase 76 03/16/2022 10:00 AM    Protein, total 9.1 (H) 03/16/2022 10:00 AM    Albumin 4.1 03/16/2022 10:00 AM    Globulin 5.0 (H) 03/16/2022 10:00 AM       Bilateral LE Doppler 3/16/2022: negative    CTA Chest 3/16/2022:  Fairly significant bilateral pulmonary emboli. Records from hospitalization reviewed and summarized above.   Test results above have been reviewed. Assessment:   1) Pulmonary embolism  Diagnosed 3/16/2022. No identifiable risk factors at that time. Treated with apixaban which she remains on currently. The current ACCP guidelines recommend patients receive a minimum of three months of anticoagulation after a VTE, during which time they are at the highest risk of progression of their thrombosis. However, patients with unprovoked events, such as this, remain at a high risk of recurrence once anticoagulation is stopped. Specifically, a 10% risk of recurrence within the first year, and 5% annual risk thereafter. Meanwhile, the risk of major bleeding on anticoagulation is <1% annually. Based on this risk/benefit ratio, the guidelines recommend continuation of anticoagulation long-term for these patients, as long as the benefit continues to outweigh the risks. It was my recommendation today that anticoagulation be continued long term, with periodic evaluation to assess the bleeding risk, fall risk, and the effect therapy is having on the patient's lifestyle. We do not need thrombophilia testing at this time. The past history of a VTE poses a greater risk for additional events than anything we would uncover on testing. As long as the patient is comfortable continuing with long-term anticoagulation, thrombophilia testing is not necessary. However, I did offer testing for her if the information would be beneficial for her family. She will think about this. Therapy with apixaban is appropriate. Once she has been on therapy for at least 6 months, a dose reduction to 2.5mg PO BID could be considered. 2) Abnormal TTE  TTE demonstrated RV with moderate to severe dilation and dysfunction. Likely due to PE. Repeat TTE, along with treadmill stress test, is planned by Dr. Abbe Pierre (cardiology).     Plan:     · Continue apixaban 5mg PO BID  · Return to see me in 4-6 months    Signed By: Leila Pena MD

## 2022-05-05 ENCOUNTER — OFFICE VISIT (OUTPATIENT)
Dept: ONCOLOGY | Age: 42
End: 2022-05-05
Payer: COMMERCIAL

## 2022-05-05 VITALS
RESPIRATION RATE: 16 BRPM | TEMPERATURE: 97.5 F | WEIGHT: 161 LBS | HEART RATE: 63 BPM | BODY MASS INDEX: 27.49 KG/M2 | DIASTOLIC BLOOD PRESSURE: 95 MMHG | HEIGHT: 64 IN | OXYGEN SATURATION: 99 % | SYSTOLIC BLOOD PRESSURE: 179 MMHG

## 2022-05-05 DIAGNOSIS — I26.99 ACUTE PULMONARY EMBOLISM, UNSPECIFIED PULMONARY EMBOLISM TYPE, UNSPECIFIED WHETHER ACUTE COR PULMONALE PRESENT (HCC): Primary | ICD-10-CM

## 2022-05-05 PROCEDURE — 99204 OFFICE O/P NEW MOD 45 MIN: CPT | Performed by: INTERNAL MEDICINE

## 2022-05-05 RX ORDER — APIXABAN 5 MG/1
TABLET, FILM COATED ORAL
COMMUNITY
Start: 2022-04-21 | End: 2022-09-09 | Stop reason: ALTCHOICE

## 2022-05-05 RX ORDER — ACETAMINOPHEN 325 MG/1
TABLET ORAL
COMMUNITY

## 2022-07-07 ENCOUNTER — ANCILLARY PROCEDURE (OUTPATIENT)
Dept: CARDIOLOGY CLINIC | Age: 42
End: 2022-07-07

## 2022-07-07 ENCOUNTER — ANCILLARY PROCEDURE (OUTPATIENT)
Dept: CARDIOLOGY CLINIC | Age: 42
End: 2022-07-07
Payer: COMMERCIAL

## 2022-07-07 VITALS
HEIGHT: 64 IN | WEIGHT: 161 LBS | SYSTOLIC BLOOD PRESSURE: 160 MMHG | DIASTOLIC BLOOD PRESSURE: 98 MMHG | BODY MASS INDEX: 27.49 KG/M2

## 2022-07-07 VITALS — HEIGHT: 64 IN | BODY MASS INDEX: 27.49 KG/M2 | WEIGHT: 161 LBS

## 2022-07-07 DIAGNOSIS — I26.99 PULMONARY EMBOLISM, UNSPECIFIED CHRONICITY, UNSPECIFIED PULMONARY EMBOLISM TYPE, UNSPECIFIED WHETHER ACUTE COR PULMONALE PRESENT (HCC): ICD-10-CM

## 2022-07-07 LAB
ECHO AO ASC DIAM: 2.4 CM
ECHO AO ASCENDING AORTA INDEX: 1.35 CM/M2
ECHO AO ROOT DIAM: 2.8 CM
ECHO AO ROOT INDEX: 1.57 CM/M2
ECHO AV AREA PEAK VELOCITY: 2.4 CM2
ECHO AV AREA VTI: 2.7 CM2
ECHO AV AREA/BSA PEAK VELOCITY: 1.3 CM2/M2
ECHO AV AREA/BSA VTI: 1.5 CM2/M2
ECHO AV MEAN GRADIENT: 5 MMHG
ECHO AV MEAN VELOCITY: 1 M/S
ECHO AV PEAK GRADIENT: 8 MMHG
ECHO AV PEAK VELOCITY: 1.4 M/S
ECHO AV VELOCITY RATIO: 0.86
ECHO AV VTI: 26.5 CM
ECHO LA DIAMETER INDEX: 1.8 CM/M2
ECHO LA DIAMETER: 3.2 CM
ECHO LA TO AORTIC ROOT RATIO: 1.14
ECHO LA VOL 2C: 40 ML (ref 22–52)
ECHO LA VOL 4C: 50 ML (ref 22–52)
ECHO LA VOL BP: 45 ML (ref 22–52)
ECHO LA VOL/BSA BIPLANE: 25 ML/M2 (ref 16–34)
ECHO LA VOLUME AREA LENGTH: 48 ML
ECHO LA VOLUME INDEX A2C: 22 ML/M2 (ref 16–34)
ECHO LA VOLUME INDEX A4C: 28 ML/M2 (ref 16–34)
ECHO LA VOLUME INDEX AREA LENGTH: 27 ML/M2 (ref 16–34)
ECHO LV E' LATERAL VELOCITY: 8 CM/S
ECHO LV E' SEPTAL VELOCITY: 9 CM/S
ECHO LV EDV A2C: 66 ML
ECHO LV EDV A4C: 77 ML
ECHO LV EDV BP: 72 ML (ref 56–104)
ECHO LV EDV INDEX A4C: 43 ML/M2
ECHO LV EDV INDEX BP: 40 ML/M2
ECHO LV EDV NDEX A2C: 37 ML/M2
ECHO LV EJECTION FRACTION A2C: 55 %
ECHO LV EJECTION FRACTION A4C: 65 %
ECHO LV EJECTION FRACTION BIPLANE: 61 % (ref 55–100)
ECHO LV ESV A2C: 30 ML
ECHO LV ESV A4C: 27 ML
ECHO LV ESV BP: 28 ML (ref 19–49)
ECHO LV ESV INDEX A2C: 17 ML/M2
ECHO LV ESV INDEX A4C: 15 ML/M2
ECHO LV ESV INDEX BP: 16 ML/M2
ECHO LV FRACTIONAL SHORTENING: 30 % (ref 28–44)
ECHO LV INTERNAL DIMENSION DIASTOLE INDEX: 2.25 CM/M2
ECHO LV INTERNAL DIMENSION DIASTOLIC: 4 CM (ref 3.9–5.3)
ECHO LV INTERNAL DIMENSION SYSTOLIC INDEX: 1.57 CM/M2
ECHO LV INTERNAL DIMENSION SYSTOLIC: 2.8 CM
ECHO LV IVSD: 1 CM (ref 0.6–0.9)
ECHO LV MASS 2D: 109.7 G (ref 67–162)
ECHO LV MASS INDEX 2D: 61.6 G/M2 (ref 43–95)
ECHO LV POSTERIOR WALL DIASTOLIC: 0.8 CM (ref 0.6–0.9)
ECHO LV RELATIVE WALL THICKNESS RATIO: 0.4
ECHO LVOT AREA: 2.8 CM2
ECHO LVOT AV VTI INDEX: 0.92
ECHO LVOT DIAM: 1.9 CM
ECHO LVOT MEAN GRADIENT: 3 MMHG
ECHO LVOT PEAK GRADIENT: 6 MMHG
ECHO LVOT PEAK VELOCITY: 1.2 M/S
ECHO LVOT STROKE VOLUME INDEX: 39 ML/M2
ECHO LVOT SV: 69.4 ML
ECHO LVOT VTI: 24.5 CM
ECHO MV A VELOCITY: 0.82 M/S
ECHO MV AREA PHT: 2.8 CM2
ECHO MV E DECELERATION TIME (DT): 273.5 MS
ECHO MV E VELOCITY: 0.54 M/S
ECHO MV E/A RATIO: 0.66
ECHO MV E/E' LATERAL: 6.75
ECHO MV E/E' RATIO (AVERAGED): 6.38
ECHO MV E/E' SEPTAL: 6
ECHO MV PRESSURE HALF TIME (PHT): 79.3 MS
ECHO RV FREE WALL PEAK S': 12 CM/S
ECHO RV INTERNAL DIMENSION: 3.8 CM
ECHO RV TAPSE: 1.8 CM (ref 1.7–?)
STRESS ANGINA INDEX: 0
STRESS BASELINE DIAS BP: 90 MMHG
STRESS BASELINE HR: 97 BPM
STRESS BASELINE SYS BP: 154 MMHG
STRESS ESTIMATED WORKLOAD: 10.1 METS
STRESS EXERCISE DUR MIN: 7 MIN
STRESS EXERCISE DUR SEC: 0 SEC
STRESS O2 SAT PEAK: 99 %
STRESS O2 SAT REST: 99 %
STRESS PEAK DIAS BP: 94 MMHG
STRESS PEAK SYS BP: 166 MMHG
STRESS PERCENT HR ACHIEVED: 94 %
STRESS POST PEAK HR: 169 BPM
STRESS RATE PRESSURE PRODUCT: NORMAL BPM*MMHG
STRESS SR DUKE TREADMILL SCORE: 7
STRESS ST DEPRESSION: 0 MM
STRESS TARGET HR: 180 BPM

## 2022-07-07 PROCEDURE — 93306 TTE W/DOPPLER COMPLETE: CPT | Performed by: SPECIALIST

## 2022-07-07 PROCEDURE — 93015 CV STRESS TEST SUPVJ I&R: CPT | Performed by: SPECIALIST

## 2022-07-08 NOTE — PROGRESS NOTES
Dear Ms. Linaresora Santosh,  Your echo and stress test are normal.    Please let me know if you have any questions.    Dr. Peter Nagel

## 2022-09-08 NOTE — PROGRESS NOTES
[facility] at [place] [address] [facility]  Moises Aidan: [phone]  F: [phone]      Reason for Visit:   Brissa Pabon is a 37 y.o. female who is seen for follow up of pulmonary embolism. History of Present Illness:   She is doing fairly well. Some mild DENIS, but overall improving. Some cough. Remains on apixaban, tolerating this well, no bleeding. She is having trouble remembering her evening dose. Review of systems was obtained and pertinent findings reviewed above. Past medical history, social history, family history, medications, and allergies are located in the electronic medical record. Physical Exam:     Visit Vitals  BP (!) 153/92 (BP 1 Location: Left upper arm, BP Patient Position: Sitting, BP Cuff Size: Large adult) Comment: . Pulse 73   Temp 97.4 °F (36.3 °C) (Temporal)   Resp 18   Ht 5' 4\" (1.626 m)   Wt 162 lb (73.5 kg)   LMP 11/22/2021   SpO2 98%   BMI 27.81 kg/m²     General: no distress  Respiratory: normal respiratory effort  CV: no peripheral edema  Skin: no rashes; no ecchymoses; no petechiae      Results:     Lab Results   Component Value Date/Time    WBC 9.8 03/17/2022 03:04 AM    HGB 11.8 03/17/2022 03:04 AM    HCT 37.4 03/17/2022 03:04 AM    PLATELET 175 72/30/2482 03:04 AM    MCV 85.4 03/17/2022 03:04 AM    ABS. NEUTROPHILS 4.1 03/16/2022 12:34 PM     Lab Results   Component Value Date/Time    Sodium 140 03/17/2022 03:04 AM    Potassium 3.8 03/17/2022 03:04 AM    Chloride 110 (H) 03/17/2022 03:04 AM    CO2 21 03/17/2022 03:04 AM    Glucose 147 (H) 03/17/2022 03:04 AM    BUN 9 03/17/2022 03:04 AM    Creatinine 0.74 03/17/2022 03:04 AM    GFR est AA >60 03/17/2022 03:04 AM    GFR est non-AA >60 03/17/2022 03:04 AM    Calcium 8.5 03/17/2022 03:04 AM     Lab Results   Component Value Date/Time    Bilirubin, total 0.4 03/16/2022 10:00 AM    ALT (SGPT) 31 03/16/2022 10:00 AM    Alk.  phosphatase 76 03/16/2022 10:00 AM Protein, total 9.1 (H) 03/16/2022 10:00 AM    Albumin 4.1 03/16/2022 10:00 AM    Globulin 5.0 (H) 03/16/2022 10:00 AM       Bilateral LE Doppler 3/16/2022: negative    CTA Chest 3/16/2022:  Fairly significant bilateral pulmonary emboli. Assessment:   1) Pulmonary embolism  Diagnosed 3/16/2022. No identifiable risk factors at that time. Treated with apixaban which she remains on currently. As her event was unprovoked, I have recommended long-term anticoagulation. See initial consult note for details. She is tolerating with apixaban well, no bleeding issues. She has now been on treatment for 6 months, we discussed the option of reducing to half dose. We also discussed switching to Xarelto, as she is having some compliance issues remembering to take the apixaban BID. She would like to make these changes. 2) Abnormal TTE  TTE demonstrated RV with moderate to severe dilation and dysfunction. Likely due to PE. Repeat TTE with Dr. Jennifer Millan in 7/2022 was normal, note from Dr. Jennifer Millan reviewed.     Plan:     Continue apixaban 5mg PO BID until current script is completed, then switch to rivaroxaban 10mg PO daily  Labs yearly while on therapy: CBC, CMP  Return to see me in 12 months    Signed By: Alfred Marcelo MD

## 2022-09-09 ENCOUNTER — OFFICE VISIT (OUTPATIENT)
Dept: ONCOLOGY | Age: 42
End: 2022-09-09
Payer: COMMERCIAL

## 2022-09-09 VITALS
DIASTOLIC BLOOD PRESSURE: 92 MMHG | HEIGHT: 64 IN | BODY MASS INDEX: 27.66 KG/M2 | TEMPERATURE: 97.4 F | SYSTOLIC BLOOD PRESSURE: 153 MMHG | HEART RATE: 73 BPM | OXYGEN SATURATION: 98 % | RESPIRATION RATE: 18 BRPM | WEIGHT: 162 LBS

## 2022-09-09 DIAGNOSIS — I26.99 ACUTE PULMONARY EMBOLISM, UNSPECIFIED PULMONARY EMBOLISM TYPE, UNSPECIFIED WHETHER ACUTE COR PULMONALE PRESENT (HCC): Primary | ICD-10-CM

## 2022-09-09 PROCEDURE — 99214 OFFICE O/P EST MOD 30 MIN: CPT | Performed by: INTERNAL MEDICINE

## 2022-09-09 NOTE — PROGRESS NOTES
Seneca Primus is a 37 y.o. female follow up for PE.    1. Have you been to the ER, urgent care clinic since your last visit? Hospitalized since your last visit?no     2. Have you seen or consulted any other health care providers outside of the [address] [place] [address] since your last visit? Include any pap smears or colon screening.  no

## 2022-11-14 ENCOUNTER — TRANSCRIBE ORDER (OUTPATIENT)
Dept: SCHEDULING | Age: 42
End: 2022-11-14

## 2022-11-14 DIAGNOSIS — I26.94 MULTIPLE SUBSEGMENTAL PULMONARY EMBOLI WITHOUT ACUTE COR PULMONALE (HCC): Primary | ICD-10-CM

## 2022-12-01 ENCOUNTER — HOSPITAL ENCOUNTER (OUTPATIENT)
Dept: CT IMAGING | Age: 42
Discharge: HOME | End: 2022-12-01
Attending: INTERNAL MEDICINE

## 2023-10-04 ENCOUNTER — HOSPITAL ENCOUNTER (OUTPATIENT)
Facility: HOSPITAL | Age: 43
Setting detail: OBSERVATION
LOS: 1 days | Discharge: HOME | End: 2023-10-05
Attending: EMERGENCY MEDICINE | Admitting: INTERNAL MEDICINE
Payer: COMMERCIAL

## 2023-10-04 ENCOUNTER — APPOINTMENT (OUTPATIENT)
Facility: HOSPITAL | Age: 43
End: 2023-10-04
Payer: COMMERCIAL

## 2023-10-04 ENCOUNTER — TELEPHONE (OUTPATIENT)
Age: 43
End: 2023-10-04

## 2023-10-04 ENCOUNTER — OFFICE VISIT (OUTPATIENT)
Age: 43
End: 2023-10-04
Payer: COMMERCIAL

## 2023-10-04 VITALS
HEIGHT: 64 IN | TEMPERATURE: 98 F | DIASTOLIC BLOOD PRESSURE: 100 MMHG | OXYGEN SATURATION: 99 % | RESPIRATION RATE: 16 BRPM | HEART RATE: 56 BPM | BODY MASS INDEX: 26.46 KG/M2 | WEIGHT: 155 LBS | SYSTOLIC BLOOD PRESSURE: 200 MMHG

## 2023-10-04 DIAGNOSIS — I67.4 HYPERTENSIVE ENCEPHALOPATHY: Primary | ICD-10-CM

## 2023-10-04 DIAGNOSIS — I26.99 OTHER ACUTE PULMONARY EMBOLISM WITHOUT ACUTE COR PULMONALE (HCC): ICD-10-CM

## 2023-10-04 DIAGNOSIS — R79.89 ELEVATED TROPONIN: ICD-10-CM

## 2023-10-04 DIAGNOSIS — I27.82 CHRONIC PULMONARY EMBOLISM WITHOUT ACUTE COR PULMONALE, UNSPECIFIED PULMONARY EMBOLISM TYPE (HCC): ICD-10-CM

## 2023-10-04 DIAGNOSIS — I26.99 OTHER ACUTE PULMONARY EMBOLISM WITHOUT ACUTE COR PULMONALE (HCC): Primary | ICD-10-CM

## 2023-10-04 PROBLEM — I16.0 HYPERTENSIVE URGENCY: Status: ACTIVE | Noted: 2023-10-04

## 2023-10-04 LAB
ALBUMIN SERPL-MCNC: 3.3 G/DL (ref 3.5–5)
ALBUMIN/GLOB SERPL: 0.8 (ref 1.1–2.2)
ALP SERPL-CCNC: 64 U/L (ref 45–117)
ALT SERPL-CCNC: 37 U/L (ref 12–78)
AMPHET UR QL SCN: NEGATIVE
ANION GAP SERPL CALC-SCNC: 9 MMOL/L (ref 5–15)
AST SERPL-CCNC: 42 U/L (ref 15–37)
BARBITURATES UR QL SCN: NEGATIVE
BASOPHILS # BLD: 0.1 K/UL (ref 0–0.1)
BASOPHILS NFR BLD: 1 % (ref 0–1)
BENZODIAZ UR QL: NEGATIVE
BILIRUB SERPL-MCNC: 0.3 MG/DL (ref 0.2–1)
BUN SERPL-MCNC: 5 MG/DL (ref 6–20)
BUN/CREAT SERPL: 7 (ref 12–20)
CALCIUM SERPL-MCNC: 8.3 MG/DL (ref 8.5–10.1)
CANNABINOIDS UR QL SCN: NEGATIVE
CHLORIDE SERPL-SCNC: 103 MMOL/L (ref 97–108)
CO2 SERPL-SCNC: 26 MMOL/L (ref 21–32)
COCAINE UR QL SCN: NEGATIVE
CREAT SERPL-MCNC: 0.69 MG/DL (ref 0.55–1.02)
DIFFERENTIAL METHOD BLD: ABNORMAL
EOSINOPHIL # BLD: 0.1 K/UL (ref 0–0.4)
EOSINOPHIL NFR BLD: 1 % (ref 0–7)
ERYTHROCYTE [DISTWIDTH] IN BLOOD BY AUTOMATED COUNT: 17.2 % (ref 11.5–14.5)
GLOBULIN SER CALC-MCNC: 4.2 G/DL (ref 2–4)
GLUCOSE SERPL-MCNC: 98 MG/DL (ref 65–100)
HCG UR QL: NEGATIVE
HCT VFR BLD AUTO: 32.6 % (ref 35–47)
HGB BLD-MCNC: 10.1 G/DL (ref 11.5–16)
IMM GRANULOCYTES # BLD AUTO: 0 K/UL (ref 0–0.04)
IMM GRANULOCYTES NFR BLD AUTO: 0 % (ref 0–0.5)
LYMPHOCYTES # BLD: 2.8 K/UL (ref 0.8–3.5)
LYMPHOCYTES NFR BLD: 45 % (ref 12–49)
Lab: NORMAL
MCH RBC QN AUTO: 23.1 PG (ref 26–34)
MCHC RBC AUTO-ENTMCNC: 31 G/DL (ref 30–36.5)
MCV RBC AUTO: 74.4 FL (ref 80–99)
METHADONE UR QL: NEGATIVE
MONOCYTES # BLD: 0.5 K/UL (ref 0–1)
MONOCYTES NFR BLD: 8 % (ref 5–13)
NEUTS SEG # BLD: 2.8 K/UL (ref 1.8–8)
NEUTS SEG NFR BLD: 45 % (ref 32–75)
NRBC # BLD: 0 K/UL (ref 0–0.01)
NRBC BLD-RTO: 0 PER 100 WBC
NT PRO BNP: 83 PG/ML (ref 0–125)
OPIATES UR QL: NEGATIVE
PCP UR QL: NEGATIVE
PLATELET # BLD AUTO: 467 K/UL (ref 150–400)
PMV BLD AUTO: 10.3 FL (ref 8.9–12.9)
POTASSIUM SERPL-SCNC: 4.1 MMOL/L (ref 3.5–5.1)
PROT SERPL-MCNC: 7.5 G/DL (ref 6.4–8.2)
RBC # BLD AUTO: 4.38 M/UL (ref 3.8–5.2)
SODIUM SERPL-SCNC: 138 MMOL/L (ref 136–145)
TROPONIN I SERPL HS-MCNC: 270 NG/L (ref 0–51)
TROPONIN I SERPL HS-MCNC: 285 NG/L (ref 0–51)
TROPONIN I SERPL HS-MCNC: 307 NG/L (ref 0–51)
WBC # BLD AUTO: 6.2 K/UL (ref 3.6–11)

## 2023-10-04 PROCEDURE — 71045 X-RAY EXAM CHEST 1 VIEW: CPT

## 2023-10-04 PROCEDURE — 80307 DRUG TEST PRSMV CHEM ANLYZR: CPT

## 2023-10-04 PROCEDURE — 96366 THER/PROPH/DIAG IV INF ADDON: CPT

## 2023-10-04 PROCEDURE — 6360000004 HC RX CONTRAST MEDICATION: Performed by: EMERGENCY MEDICINE

## 2023-10-04 PROCEDURE — 36415 COLL VENOUS BLD VENIPUNCTURE: CPT

## 2023-10-04 PROCEDURE — 70496 CT ANGIOGRAPHY HEAD: CPT

## 2023-10-04 PROCEDURE — 99285 EMERGENCY DEPT VISIT HI MDM: CPT

## 2023-10-04 PROCEDURE — 96374 THER/PROPH/DIAG INJ IV PUSH: CPT

## 2023-10-04 PROCEDURE — 96375 TX/PRO/DX INJ NEW DRUG ADDON: CPT

## 2023-10-04 PROCEDURE — 96365 THER/PROPH/DIAG IV INF INIT: CPT

## 2023-10-04 PROCEDURE — 84484 ASSAY OF TROPONIN QUANT: CPT

## 2023-10-04 PROCEDURE — 83880 ASSAY OF NATRIURETIC PEPTIDE: CPT

## 2023-10-04 PROCEDURE — 99214 OFFICE O/P EST MOD 30 MIN: CPT | Performed by: INTERNAL MEDICINE

## 2023-10-04 PROCEDURE — 2580000003 HC RX 258: Performed by: INTERNAL MEDICINE

## 2023-10-04 PROCEDURE — 93005 ELECTROCARDIOGRAM TRACING: CPT | Performed by: EMERGENCY MEDICINE

## 2023-10-04 PROCEDURE — 1200000000 HC SEMI PRIVATE

## 2023-10-04 PROCEDURE — 70450 CT HEAD/BRAIN W/O DYE: CPT

## 2023-10-04 PROCEDURE — 2500000003 HC RX 250 WO HCPCS: Performed by: EMERGENCY MEDICINE

## 2023-10-04 PROCEDURE — 81025 URINE PREGNANCY TEST: CPT

## 2023-10-04 PROCEDURE — 2580000003 HC RX 258: Performed by: EMERGENCY MEDICINE

## 2023-10-04 PROCEDURE — 80053 COMPREHEN METABOLIC PANEL: CPT

## 2023-10-04 PROCEDURE — 6360000002 HC RX W HCPCS: Performed by: EMERGENCY MEDICINE

## 2023-10-04 PROCEDURE — 6370000000 HC RX 637 (ALT 250 FOR IP): Performed by: INTERNAL MEDICINE

## 2023-10-04 PROCEDURE — 85025 COMPLETE CBC W/AUTO DIFF WBC: CPT

## 2023-10-04 RX ORDER — SODIUM CHLORIDE 0.9 % (FLUSH) 0.9 %
5-40 SYRINGE (ML) INJECTION PRN
Status: DISCONTINUED | OUTPATIENT
Start: 2023-10-04 | End: 2023-10-05 | Stop reason: HOSPADM

## 2023-10-04 RX ORDER — POLYETHYLENE GLYCOL 3350 17 G/17G
17 POWDER, FOR SOLUTION ORAL DAILY PRN
Status: DISCONTINUED | OUTPATIENT
Start: 2023-10-04 | End: 2023-10-05 | Stop reason: HOSPADM

## 2023-10-04 RX ORDER — ACETAMINOPHEN 325 MG/1
650 TABLET ORAL EVERY 6 HOURS PRN
Status: DISCONTINUED | OUTPATIENT
Start: 2023-10-04 | End: 2023-10-05 | Stop reason: HOSPADM

## 2023-10-04 RX ORDER — TRAZODONE HYDROCHLORIDE 50 MG/1
50 TABLET ORAL NIGHTLY
Status: DISCONTINUED | OUTPATIENT
Start: 2023-10-04 | End: 2023-10-05 | Stop reason: HOSPADM

## 2023-10-04 RX ORDER — FUROSEMIDE 10 MG/ML
20 INJECTION INTRAMUSCULAR; INTRAVENOUS ONCE
Status: DISCONTINUED | OUTPATIENT
Start: 2023-10-04 | End: 2023-10-05 | Stop reason: HOSPADM

## 2023-10-04 RX ORDER — SODIUM CHLORIDE 9 MG/ML
INJECTION, SOLUTION INTRAVENOUS PRN
Status: DISCONTINUED | OUTPATIENT
Start: 2023-10-04 | End: 2023-10-05 | Stop reason: HOSPADM

## 2023-10-04 RX ORDER — ACETAMINOPHEN 650 MG/1
650 SUPPOSITORY RECTAL EVERY 6 HOURS PRN
Status: DISCONTINUED | OUTPATIENT
Start: 2023-10-04 | End: 2023-10-05 | Stop reason: HOSPADM

## 2023-10-04 RX ORDER — TRAZODONE HYDROCHLORIDE 50 MG/1
50 TABLET ORAL
Status: ON HOLD | COMMUNITY
Start: 2023-08-10 | End: 2023-10-05 | Stop reason: HOSPADM

## 2023-10-04 RX ORDER — ONDANSETRON 2 MG/ML
4 INJECTION INTRAMUSCULAR; INTRAVENOUS EVERY 6 HOURS PRN
Status: DISCONTINUED | OUTPATIENT
Start: 2023-10-04 | End: 2023-10-05 | Stop reason: HOSPADM

## 2023-10-04 RX ORDER — ESCITALOPRAM OXALATE 10 MG/1
10 TABLET ORAL NIGHTLY
Status: DISCONTINUED | OUTPATIENT
Start: 2023-10-04 | End: 2023-10-05 | Stop reason: HOSPADM

## 2023-10-04 RX ORDER — ONDANSETRON 4 MG/1
4 TABLET, ORALLY DISINTEGRATING ORAL EVERY 8 HOURS PRN
Status: DISCONTINUED | OUTPATIENT
Start: 2023-10-04 | End: 2023-10-05 | Stop reason: HOSPADM

## 2023-10-04 RX ORDER — ESCITALOPRAM OXALATE 10 MG/1
10 TABLET ORAL DAILY
Status: ON HOLD | COMMUNITY
Start: 2023-08-10 | End: 2023-10-05 | Stop reason: HOSPADM

## 2023-10-04 RX ORDER — HYDRALAZINE HYDROCHLORIDE 20 MG/ML
10 INJECTION INTRAMUSCULAR; INTRAVENOUS
Status: COMPLETED | OUTPATIENT
Start: 2023-10-04 | End: 2023-10-04

## 2023-10-04 RX ORDER — SODIUM CHLORIDE 0.9 % (FLUSH) 0.9 %
5-40 SYRINGE (ML) INJECTION EVERY 12 HOURS SCHEDULED
Status: DISCONTINUED | OUTPATIENT
Start: 2023-10-04 | End: 2023-10-05 | Stop reason: HOSPADM

## 2023-10-04 RX ADMIN — SODIUM CHLORIDE, PRESERVATIVE FREE 10 ML: 5 INJECTION INTRAVENOUS at 22:04

## 2023-10-04 RX ADMIN — IOPAMIDOL 100 ML: 755 INJECTION, SOLUTION INTRAVENOUS at 16:28

## 2023-10-04 RX ADMIN — SODIUM CHLORIDE 2.5 MG/HR: 9 INJECTION, SOLUTION INTRAVENOUS at 17:32

## 2023-10-04 RX ADMIN — RIVAROXABAN 10 MG: 10 TABLET, FILM COATED ORAL at 19:17

## 2023-10-04 RX ADMIN — ESCITALOPRAM OXALATE 10 MG: 10 TABLET ORAL at 21:25

## 2023-10-04 RX ADMIN — HYDRALAZINE HYDROCHLORIDE 10 MG: 20 INJECTION, SOLUTION INTRAMUSCULAR; INTRAVENOUS at 17:02

## 2023-10-04 RX ADMIN — TRAZODONE HYDROCHLORIDE 50 MG: 50 TABLET ORAL at 21:50

## 2023-10-04 ASSESSMENT — PAIN SCALES - GENERAL
PAINLEVEL_OUTOF10: 0
PAINLEVEL_OUTOF10: 0

## 2023-10-04 ASSESSMENT — PAIN - FUNCTIONAL ASSESSMENT: PAIN_FUNCTIONAL_ASSESSMENT: 0-10

## 2023-10-04 NOTE — ED NOTES
Pt now reporting confusion. Pt states it has been going on since she knew BP was elevated. Pt unable to communicate what she means by confusion. MD at bedside assessing patient.      Scherry Gaucher, RN  10/04/23 [id number]

## 2023-10-04 NOTE — ED NOTES
Received report from primary RN. Rounded on pt, Pt A&O x 4, helped pt to the bathroom with drip still going. Pt in NAD, RR even and unlabored. Family at bedside. Call bell within reach, updated pt on plan of care.      Nubia Hirsch RN  10/04/23 8425

## 2023-10-04 NOTE — ED NOTES
Pt returned from bathroom and this RN witnessed pt being SOB with RR of 38 and labored. This RN instructed pt to try and control their breathing and focus on slowing their breathing down. Pt RR returned to 22 and even and unlabored. Pt states only has feeling of SOB when getting up and moments of exertion, while laying in bed no evidence of SOB. Md made aware.      Mikayla Bledsoe RN  10/04/23 1924

## 2023-10-04 NOTE — ED NOTES
Sent note to Hospitalist about change in pt condition. Also made MD ED aware.      Dario Giles RN  10/04/23 8961

## 2023-10-04 NOTE — TELEPHONE ENCOUNTER
10/4/23- Per Dr. Gris Rodney patient has a high copay of $50 for Xarelto and was inquiring about copay assistance. Called patients [address] [facility] to inquire if a prior authorization was needed and if Rosacelia Cotton had patients secondary Medicaid insurance. Per pharmacist no prior authorization is needed and the pharmacy only had patients [place]. Pharmacist added patients Medicaid coverage for secondary and now patients cost of care for Xarelto is now $0 copay. Called patient and left a voicemail with the above information requesting a return call with any further questions or concerns regarding the Xarelto copay.

## 2023-10-04 NOTE — H&P
History and Physical    Date of Service:  10/4/2023  Primary Care Provider: Jennifer Oconnell MD  Source of information: Patient/documents    Chief Complaint: Hypertension (Per pt reports elevated BP today, +headache and denies chest pain, dizziness and sob. Pt is compliant with medication regimen that consist of Lisinopril and Atenolol. PCP (Dr. Mike Donohue) advised pt to follow up with ED for further evaluation. )      History of Presenting Illness:   Jose Nichols is a 44 y.o. female who presents with past medical history of pulmonary embolism (unprovoked, on long-term Xarelto, diagnosed in 2022), hypertension, Depression/anxiety, who went to oncologist office for routine PE follow-up and was found to have elevated blood pressure and was referred to PCP office. Reffered to ED from PCP for significantly elevated blood pressure. In ED, she was noted to have some confusion and CTA head was done. It did not show any acute abnormality. Blood pressure remained elevated with SBP above 200. Patient was started on nicardipine drip and I was called for admission. Upon my interview, patient appears comfortably lying in bed and denies any significant headache, chest pain, shortness of breath or abdominal pain. The patient denies any headache, blurry vision, sore throat, trouble swallowing, trouble with speech, chest pain, SOB, cough, fever, chills, N/V/D, abd pain, urinary symptoms, constipation, recent travels, sick contacts, focal or generalized neurological symptoms, falls, injuries, rashes, contact with COVID-19 diagnosed patients, hematemesis, melena, hemoptysis, hematuria, rashes, denies starting any new medications and denies any other concerns or problems besides as mentioned above. REVIEW OF SYSTEMS:  A comprehensive review of systems was negative except for that written in the History of Present Illness.      Past Medical History:   Diagnosis Date    Acute pulmonary embolism ([address])

## 2023-10-04 NOTE — PROGRESS NOTES
Juan J Rock is a 44 y.o. femaleo follow up for PE.    1. Have you been to the ER, urgent care clinic since your last visit? Hospitalized since your last visit?no     2. Have you seen or consulted any other health care providers outside of the [address] [place] [address] since your last visit? Include any pap smears or colon screening.  no
that time. Treated with apixaban for over 6 months, then transitioned to rivaroxaban 10mg daily. As her event was unprovoked, I have recommended long-term anticoagulation. See initial consult note for details. We have transitioned to low dose rivaroxaban 10mg PO daily, which she is tolerating well. We will continue with therapy. HTN  Markedly elevated today. Has been running high at home as well. I urged her to follow up with her PCP ASAP about this and see if her medication needs to be adjusted. Discussed symptoms that should prompt urgent ED evaluation. Abnormal TTE  TTE demonstrated RV with moderate to severe dilation and dysfunction. Likely due to PE.  Repeat TTE with Dr. Donny Barry in 7/2022 was normal.      Plan:     Continue rivaroxaban 10mg PO daily  Labs yearly while on therapy: CBC, BMP, LFTs  Follow up with PCP ASAP for HTN  Return to see me in 12 months      Signed By: Graciela Zayas MD

## 2023-10-05 ENCOUNTER — APPOINTMENT (OUTPATIENT)
Facility: HOSPITAL | Age: 43
End: 2023-10-05
Attending: INTERNAL MEDICINE
Payer: COMMERCIAL

## 2023-10-05 ENCOUNTER — TELEPHONE (OUTPATIENT)
Age: 43
End: 2023-10-05

## 2023-10-05 VITALS
HEART RATE: 61 BPM | SYSTOLIC BLOOD PRESSURE: 119 MMHG | TEMPERATURE: 98.2 F | HEIGHT: 64 IN | DIASTOLIC BLOOD PRESSURE: 74 MMHG | OXYGEN SATURATION: 98 % | RESPIRATION RATE: 20 BRPM | WEIGHT: 157 LBS | BODY MASS INDEX: 26.8 KG/M2

## 2023-10-05 DIAGNOSIS — D64.9 ANEMIA, UNSPECIFIED TYPE: Primary | ICD-10-CM

## 2023-10-05 DIAGNOSIS — D75.839 THROMBOCYTOSIS: ICD-10-CM

## 2023-10-05 LAB
ALBUMIN SERPL-MCNC: 3.1 G/DL (ref 3.5–5)
ALBUMIN/GLOB SERPL: 0.8 (ref 1.1–2.2)
ALP SERPL-CCNC: 60 U/L (ref 45–117)
ALT SERPL-CCNC: 30 U/L (ref 12–78)
ANION GAP SERPL CALC-SCNC: 11 MMOL/L (ref 5–15)
AST SERPL-CCNC: 29 U/L (ref 15–37)
BASOPHILS # BLD: 0.1 K/UL (ref 0–0.1)
BASOPHILS NFR BLD: 1 % (ref 0–1)
BILIRUB SERPL-MCNC: 0.4 MG/DL (ref 0.2–1)
BUN SERPL-MCNC: 5 MG/DL (ref 6–20)
BUN/CREAT SERPL: 8 (ref 12–20)
CALCIUM SERPL-MCNC: 8.3 MG/DL (ref 8.5–10.1)
CHLORIDE SERPL-SCNC: 102 MMOL/L (ref 97–108)
CO2 SERPL-SCNC: 26 MMOL/L (ref 21–32)
CREAT SERPL-MCNC: 0.6 MG/DL (ref 0.55–1.02)
DIFFERENTIAL METHOD BLD: ABNORMAL
ECHO AO ROOT DIAM: 2.1 CM
ECHO AO ROOT INDEX: 1.19 CM/M2
ECHO AV AREA PEAK VELOCITY: 2.5 CM2
ECHO AV AREA PLAN/BSA: 1.42 CM2/M2
ECHO AV AREA PLAN: 2.5 CM2
ECHO AV AREA/BSA PEAK VELOCITY: 1.4 CM2/M2
ECHO AV PEAK GRADIENT: 7 MMHG
ECHO AV PEAK VELOCITY: 1.4 M/S
ECHO AV VELOCITY RATIO: 0.86
ECHO BSA: 1.79 M2
ECHO EST RA PRESSURE: 5 MMHG
ECHO LA DIAMETER INDEX: 1.82 CM/M2
ECHO LA DIAMETER: 3.2 CM
ECHO LA TO AORTIC ROOT RATIO: 1.52
ECHO LA VOL 4C: 39 ML (ref 22–52)
ECHO LA VOLUME INDEX A4C: 22 ML/M2 (ref 16–34)
ECHO LV EDV A4C: 86 ML
ECHO LV EDV INDEX A4C: 49 ML/M2
ECHO LV EJECTION FRACTION A4C: 59 %
ECHO LV ESV A4C: 35 ML
ECHO LV ESV INDEX A4C: 20 ML/M2
ECHO LV FRACTIONAL SHORTENING: 27 % (ref 28–44)
ECHO LV INTERNAL DIMENSION DIASTOLE INDEX: 2.5 CM/M2
ECHO LV INTERNAL DIMENSION DIASTOLIC: 4.4 CM (ref 3.9–5.3)
ECHO LV INTERNAL DIMENSION SYSTOLIC INDEX: 1.82 CM/M2
ECHO LV INTERNAL DIMENSION SYSTOLIC: 3.2 CM
ECHO LV IVSD: 1.3 CM (ref 0.6–0.9)
ECHO LV MASS 2D: 168.9 G (ref 67–162)
ECHO LV MASS INDEX 2D: 96 G/M2 (ref 43–95)
ECHO LV POSTERIOR WALL DIASTOLIC: 0.9 CM (ref 0.6–0.9)
ECHO LV RELATIVE WALL THICKNESS RATIO: 0.41
ECHO LVOT AREA: 2.8 CM2
ECHO LVOT DIAM: 1.9 CM
ECHO LVOT PEAK GRADIENT: 6 MMHG
ECHO LVOT PEAK VELOCITY: 1.2 M/S
ECHO MV A VELOCITY: 0.56 M/S
ECHO MV AREA PHT: 3.4 CM2
ECHO MV E DECELERATION TIME (DT): 222.2 MS
ECHO MV E VELOCITY: 0.44 M/S
ECHO MV E/A RATIO: 0.79
ECHO MV MAX VELOCITY: 0.8 M/S
ECHO MV MEAN GRADIENT: 1 MMHG
ECHO MV MEAN VELOCITY: 0.5 M/S
ECHO MV PEAK GRADIENT: 3 MMHG
ECHO MV PRESSURE HALF TIME (PHT): 64.4 MS
ECHO MV REGURGITANT PEAK GRADIENT: 44 MMHG
ECHO MV REGURGITANT PEAK VELOCITY: 3.3 M/S
ECHO MV VTI: 29.2 CM
ECHO PULMONARY ARTERY END DIASTOLIC PRESSURE: 13 MMHG
ECHO PV MAX VELOCITY: 0.8 M/S
ECHO PV PEAK GRADIENT: 2 MMHG
ECHO RIGHT VENTRICULAR SYSTOLIC PRESSURE (RVSP): 25 MMHG
ECHO TV REGURGITANT MAX VELOCITY: 2.26 M/S
ECHO TV REGURGITANT PEAK GRADIENT: 20 MMHG
EKG ATRIAL RATE: 81 BPM
EKG DIAGNOSIS: NORMAL
EKG P AXIS: 58 DEGREES
EKG P-R INTERVAL: 146 MS
EKG Q-T INTERVAL: 414 MS
EKG QRS DURATION: 88 MS
EKG QTC CALCULATION (BAZETT): 480 MS
EKG R AXIS: 59 DEGREES
EKG T AXIS: 11 DEGREES
EKG VENTRICULAR RATE: 81 BPM
EOSINOPHIL # BLD: 0.1 K/UL (ref 0–0.4)
EOSINOPHIL NFR BLD: 1 % (ref 0–7)
ERYTHROCYTE [DISTWIDTH] IN BLOOD BY AUTOMATED COUNT: 17.3 % (ref 11.5–14.5)
GLOBULIN SER CALC-MCNC: 4.1 G/DL (ref 2–4)
GLUCOSE SERPL-MCNC: 117 MG/DL (ref 65–100)
HCT VFR BLD AUTO: 31 % (ref 35–47)
HGB BLD-MCNC: 9.8 G/DL (ref 11.5–16)
IMM GRANULOCYTES # BLD AUTO: 0 K/UL (ref 0–0.04)
IMM GRANULOCYTES NFR BLD AUTO: 0 % (ref 0–0.5)
LYMPHOCYTES # BLD: 2.8 K/UL (ref 0.8–3.5)
LYMPHOCYTES NFR BLD: 44 % (ref 12–49)
MAGNESIUM SERPL-MCNC: 1.5 MG/DL (ref 1.6–2.4)
MCH RBC QN AUTO: 22.8 PG (ref 26–34)
MCHC RBC AUTO-ENTMCNC: 31.6 G/DL (ref 30–36.5)
MCV RBC AUTO: 72.1 FL (ref 80–99)
MONOCYTES # BLD: 0.5 K/UL (ref 0–1)
MONOCYTES NFR BLD: 8 % (ref 5–13)
NEUTS SEG # BLD: 2.9 K/UL (ref 1.8–8)
NEUTS SEG NFR BLD: 46 % (ref 32–75)
NRBC # BLD: 0 K/UL (ref 0–0.01)
NRBC BLD-RTO: 0 PER 100 WBC
PLATELET # BLD AUTO: 461 K/UL (ref 150–400)
PMV BLD AUTO: 10.3 FL (ref 8.9–12.9)
POTASSIUM SERPL-SCNC: 2.9 MMOL/L (ref 3.5–5.1)
POTASSIUM SERPL-SCNC: 3.9 MMOL/L (ref 3.5–5.1)
PROT SERPL-MCNC: 7.2 G/DL (ref 6.4–8.2)
RBC # BLD AUTO: 4.3 M/UL (ref 3.8–5.2)
SODIUM SERPL-SCNC: 139 MMOL/L (ref 136–145)
TROPONIN I SERPL HS-MCNC: 248 NG/L (ref 0–51)
TROPONIN I SERPL HS-MCNC: 265 NG/L (ref 0–51)
TSH SERPL DL<=0.05 MIU/L-ACNC: 0.99 UIU/ML (ref 0.36–3.74)
WBC # BLD AUTO: 6.4 K/UL (ref 3.6–11)

## 2023-10-05 PROCEDURE — 84132 ASSAY OF SERUM POTASSIUM: CPT

## 2023-10-05 PROCEDURE — 85025 COMPLETE CBC W/AUTO DIFF WBC: CPT

## 2023-10-05 PROCEDURE — 84443 ASSAY THYROID STIM HORMONE: CPT

## 2023-10-05 PROCEDURE — 6360000002 HC RX W HCPCS: Performed by: INTERNAL MEDICINE

## 2023-10-05 PROCEDURE — 93306 TTE W/DOPPLER COMPLETE: CPT | Performed by: SPECIALIST

## 2023-10-05 PROCEDURE — 93306 TTE W/DOPPLER COMPLETE: CPT

## 2023-10-05 PROCEDURE — 83735 ASSAY OF MAGNESIUM: CPT

## 2023-10-05 PROCEDURE — 6370000000 HC RX 637 (ALT 250 FOR IP): Performed by: INTERNAL MEDICINE

## 2023-10-05 PROCEDURE — 96366 THER/PROPH/DIAG IV INF ADDON: CPT

## 2023-10-05 PROCEDURE — G0378 HOSPITAL OBSERVATION PER HR: HCPCS

## 2023-10-05 PROCEDURE — 36415 COLL VENOUS BLD VENIPUNCTURE: CPT

## 2023-10-05 PROCEDURE — 96367 TX/PROPH/DG ADDL SEQ IV INF: CPT

## 2023-10-05 PROCEDURE — 84484 ASSAY OF TROPONIN QUANT: CPT

## 2023-10-05 PROCEDURE — 80053 COMPREHEN METABOLIC PANEL: CPT

## 2023-10-05 RX ORDER — AMLODIPINE BESYLATE 5 MG/1
10 TABLET ORAL DAILY
Status: DISCONTINUED | OUTPATIENT
Start: 2023-10-05 | End: 2023-10-05 | Stop reason: HOSPADM

## 2023-10-05 RX ORDER — LISINOPRIL 5 MG/1
10 TABLET ORAL ONCE
Status: COMPLETED | OUTPATIENT
Start: 2023-10-05 | End: 2023-10-05

## 2023-10-05 RX ORDER — ESCITALOPRAM OXALATE 10 MG/1
10 TABLET ORAL NIGHTLY
Qty: 30 TABLET | Refills: 0 | Status: SHIPPED | OUTPATIENT
Start: 2023-10-05

## 2023-10-05 RX ORDER — ATENOLOL 50 MG/1
50 TABLET ORAL DAILY
Qty: 30 TABLET | Refills: 0 | Status: SHIPPED | OUTPATIENT
Start: 2023-10-06

## 2023-10-05 RX ORDER — ATENOLOL 50 MG/1
50 TABLET ORAL DAILY
Status: DISCONTINUED | OUTPATIENT
Start: 2023-10-05 | End: 2023-10-05 | Stop reason: HOSPADM

## 2023-10-05 RX ORDER — POTASSIUM CHLORIDE 7.45 MG/ML
10 INJECTION INTRAVENOUS
Status: DISPENSED | OUTPATIENT
Start: 2023-10-05 | End: 2023-10-05

## 2023-10-05 RX ORDER — POTASSIUM CHLORIDE 7.45 MG/ML
10 INJECTION INTRAVENOUS ONCE
Status: COMPLETED | OUTPATIENT
Start: 2023-10-05 | End: 2023-10-05

## 2023-10-05 RX ORDER — LISINOPRIL 5 MG/1
10 TABLET ORAL DAILY
Status: DISCONTINUED | OUTPATIENT
Start: 2023-10-05 | End: 2023-10-05

## 2023-10-05 RX ORDER — LISINOPRIL 20 MG/1
20 TABLET ORAL DAILY
Status: DISCONTINUED | OUTPATIENT
Start: 2023-10-06 | End: 2023-10-05 | Stop reason: HOSPADM

## 2023-10-05 RX ORDER — TRAZODONE HYDROCHLORIDE 50 MG/1
50 TABLET ORAL NIGHTLY
Qty: 30 TABLET | Refills: 0 | Status: SHIPPED | OUTPATIENT
Start: 2023-10-05

## 2023-10-05 RX ORDER — LISINOPRIL 20 MG/1
20 TABLET ORAL DAILY
Qty: 30 TABLET | Refills: 0 | Status: SHIPPED | OUTPATIENT
Start: 2023-10-06

## 2023-10-05 RX ORDER — AMLODIPINE BESYLATE 10 MG/1
10 TABLET ORAL DAILY
Qty: 30 TABLET | Refills: 0 | Status: SHIPPED | OUTPATIENT
Start: 2023-10-06

## 2023-10-05 RX ORDER — MAGNESIUM SULFATE IN WATER 40 MG/ML
2000 INJECTION, SOLUTION INTRAVENOUS ONCE
Status: COMPLETED | OUTPATIENT
Start: 2023-10-05 | End: 2023-10-05

## 2023-10-05 RX ORDER — RIVAROXABAN 10 MG/1
10 TABLET, FILM COATED ORAL
Status: ON HOLD | COMMUNITY
End: 2023-10-05 | Stop reason: HOSPADM

## 2023-10-05 RX ADMIN — ATENOLOL 50 MG: 50 TABLET ORAL at 08:16

## 2023-10-05 RX ADMIN — POTASSIUM CHLORIDE 10 MEQ: 7.46 INJECTION, SOLUTION INTRAVENOUS at 07:59

## 2023-10-05 RX ADMIN — LISINOPRIL 10 MG: 5 TABLET ORAL at 09:57

## 2023-10-05 RX ADMIN — POTASSIUM CHLORIDE 10 MEQ: 7.46 INJECTION, SOLUTION INTRAVENOUS at 11:10

## 2023-10-05 RX ADMIN — POTASSIUM CHLORIDE 10 MEQ: 7.46 INJECTION, SOLUTION INTRAVENOUS at 09:33

## 2023-10-05 RX ADMIN — LISINOPRIL 10 MG: 5 TABLET ORAL at 08:16

## 2023-10-05 RX ADMIN — MAGNESIUM SULFATE HEPTAHYDRATE 2000 MG: 40 INJECTION, SOLUTION INTRAVENOUS at 08:16

## 2023-10-05 RX ADMIN — AMLODIPINE BESYLATE 10 MG: 5 TABLET ORAL at 09:57

## 2023-10-05 ASSESSMENT — PAIN SCALES - GENERAL
PAINLEVEL_OUTOF10: 0

## 2023-10-05 NOTE — DISCHARGE INSTRUCTIONS
It is important that you take the medication exactly as they are prescribed. Keep your medication in the bottles provided by the pharmacist and keep a list of the medication names, dosages, and times to be taken in your wallet. Do not take other medications without consulting your doctor. NOTIFY YOUR PHYSICIAN OR GO TO THE NEAREST EMERGENCY ROOM FOR ANY OF THE FOLLOWING:   Fever over 101 degrees for 24 hours. Chest pain, shortness of breath, fever, chills, nausea, vomiting, diarrhea, change in mentation, falling, weakness, bleeding. Severe pain or pain not relieved by medications. Or, any other signs or symptoms that you may have questions about.

## 2023-10-05 NOTE — PROGRESS NOTES
Physician Progress Note      Roshan Downey  [facility] #:                  [id number]  :                       1980  ADMIT DATE:       10/4/2023 2:26 PM  [address] DATE:  RESPONDING  PROVIDER #:        Jason Pereyra MD          QUERY TEXT:    Patient admitted with hypertensive urgency. ED Physician noted confusion and   hypertensive encephalopathy. Hospitalist history and physical noted that the   patient does not appear encephalopathic at this time. If possible, please   document if hypertensive encephalopathy was: The medical record reflects the following:    Risk Factors: 44year old female with PMH of HTN presented from PCP office for   significantly elevated blood pressure  Clinical Indicators: ED Physician documented: \"Hypertensive encephalopathy\"   and \"38 year old history HTN, prior PE presenting with vague dizziness,   confusion, and hypertension\" and \"Very hypertensive on arrival to 210/100   approximately, non focal neurologic exam, doubt acute stroke\"  -- H&P documented: \"Hypertensive urgency -Patient is awake alert and oriented   x3. Following commands. Does not appear encephalopathic at this time. \" and   \"Reffered to ED from PCP for significantly elevated blood pressure. In ED,   she was noted to have some confusion and CTA head was done. It did not show   any acute abnormality. Blood pressure remained elevated with SBP above 200. \"  Treatment: Cardene gtt, BP monitoring    Thank-you,  Kelsey Vazquez RN, CCDS, [facility]  Clinical   [email or web address] [email or web address]  [phone]  You can also contact me via [employer].   Options provided:  -- Hypertensive encephalopathy confirmed after study  -- Hypertensive encephalopathy treated and resolved  -- Hypertensive encephalopathy ruled out after study  -- Other - I will add my own diagnosis  -- Disagree - Not applicable / Not valid  -- Disagree - Clinically unable to determine / Unknown  -- Refer to Clinical

## 2023-10-05 NOTE — CARE COORDINATION
Transition of Care Plan:    RUR: 8%  Prior Level of Functioning: IND  Disposition: Home  If SNF or IPR: Date FOC offered:   Date FOC received:   Accepting facility: N/A  Date authorization started with reference number:   Date authorization received and expires: Follow up appointments: CM made Primary Care appointment for Monday 10/10/23 at 11:15  Patient informed that she would like to schedule appointment and that time does not work. Zunilda Arellanor worker will assist in canceling appointment. DME needed    N/A   Transportation at discharge: significant other  IM/IMM Medicare/ letter given: N/A  Is patient a [place] and connected with [facility]? If yes, was [place] transfer form completed and VA notified? N/A  Caregiver Contact: N/A  Discharge Caregiver contacted prior to discharge? N/A  Care Conference needed?  N/A  Barriers to discharge: N/A      Cynthia Condon RN CM

## 2023-10-05 NOTE — PROGRESS NOTES
[address] [facility] 10/5/2023      RE: Alisha Fields      To Whom it May Concern: This is to certify that Alisha Fields was admitted to hospital on 10/4/2023   to 10/05/2023. She may return to work on 10/09/2023. Thank you for your assistance in this matter.     Sincerely,      Lew Solitario MD

## 2023-10-05 NOTE — TELEPHONE ENCOUNTER
Joe Mason at [facility]  [phone]    Labs from recent ED visit reviewed. Notable for anemia and thrombocytosis. I recommend repeating a CBC in a few weeks and checking some additional labs to workup these lab abnormalities. Orders placed.

## 2023-10-05 NOTE — DISCHARGE SUMMARY
starting any new medications and denies any other concerns or problems besides as mentioned above. HOSPITAL COURSE & DISCHARGE DIAGNOSIS/ PLAN:     #Hypertensive urgency  #Elevated troponin, likely secondary to demand ischemia/HTN induced  #Hypokalemia/hypomagnesemia  -Blood pressure improved. DC nicardipine drip  -Resumed home antihypertensive medications(atenolol, lisinopril). Added amlodipine. -TSH:0.99  -Trop:270->307->285->248->265  -TTE:Left Ventricle: Normal left ventricular systolic function with a visually estimated EF of 60 - 65%. Left ventricle size is normal. Mild septal thickening. Mildly increased ventricular mass. Normal wall motion  -Replaced electrolytes. -Cardiology evaluation done. F/U with cardiology service in office.  -Patient may need further work-up to rule out secondary causes of hypertension as outpatient. Will defer it to primary care physician. 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 Outpatient follow-up with primary care physician for referral/continued monitoring. #History of pulmonary embolism  -Unprovoked, on long-term Xarelto, diagnosed in 2022.  -Continue rivaroxaban.  -Routine follow-up with oncologist Dr. Geetha Romero     #History of abnormal TTE  Previously, TTE demonstrated RV with moderate to severe dilation and dysfunction. Likely due to PE. Repeat TTE with Dr. Antonette Garcia in 7/2022 was normal.  TTE during current admission:EF:60-65%. #H/O Depression/Anxiety  -Continue home medications              PENDING TEST RESULTS:   At the time of discharge the following test results are still pending:     FOLLOW UP APPOINTMENTS:    @[facility]     ADDITIONAL CARE RECOMMENDATIONS:   DIET: regular diet.   Low-sodium    ACTIVITY: activity as tolerated    WOUND CARE:     EQUIPMENT needed:       DISCHARGE MEDICATIONS:  Please see discharge medication list        NOTIFY YOUR PHYSICIAN FOR ANY OF THE FOLLOWING:   Fever over 101

## 2023-10-05 NOTE — CARE COORDINATION
CARLO:    RUR: 8%     10/05/23 1450   Service Assessment   Patient Orientation Alert and Oriented   Cognition Alert   History Provided By Patient   Primary Caregiver Self   Accompanied By/Relationship None   Support Systems Spouse/Significant Other   Patient's Healthcare Decision Maker is: Legal Next of Kin  (Spouse/partner, Jm Jimenez, [phone])   PCP Verified by CM Yes  Leti Berg MD)   Last Visit to PCP Within last 3 months  (Two days ago)   Prior Functional Level Independent in ADLs/IADLs   Current Functional Level Independent in ADLs/IADLs   Can patient return to prior living arrangement Yes   Ability to make needs known: Good   Family able to assist with home care needs: Yes   Would you like for me to discuss the discharge plan with any other family members/significant others, and if so, who? No   Financial Resources Other (Comment); Medicaid  (Employment income and health insurance)   Community Resources Other (Comment)  (PCP, cardiologist)   CM/SW Referral Other (see comment)  (Discharge planning, PCP appt, specialist appt)   Social/Functional History   Lives With Significant other   Type of Home Apartment   Discharge Planning   Type of Residence Apartment   Living Arrangements Spouse/Significant Other   Current Services Prior To Admission Other (Comment)  (PCP, cardiologist)   Potential Assistance Needed N/A   DME Ordered? No   Potential Assistance Purchasing Medications Yes  (CM provided prescription assistance card)   Type of Home Care Services None   Patient expects to be discharged to: Apartment   Follow Up Appointment: Best Day/Time    (No preference - patient would like to schedule her appointments due to work schedule.)   One/Two Story Residence Other (comment)  (N/A)   History of falls?  0   Services At/After Discharge   Transition of Care Consult (CM Consult) Discharge Planning;Medication Assistance  (PCP, cardiology apt, discharge planning)   [address] Discharge Outpatient

## 2023-10-05 NOTE — CONSULTS
Date of  Admission: 10/4/2023  2:26 PM  Primary Care Physician: Karly Kerns MD     Anastasiia Meléndez is a 44 y.o. female admitted for Hypertensive encephalopathy [I67.4]  Elevated troponin [R79.89]  Hypertensive urgency [I16.0]  Chronic pulmonary embolism without acute cor pulmonale, unspecified pulmonary embolism type ([address]) [I27.82]    Reason for consult: Hypertensive urgency, elevated troponin. HPI:   This is a 81-year-old female with a past medical history including hypertension, Graves' disease (in remission), depression, and unprovoked pulmonary embolism (with lifelong Xarelto, follows with hematology) who is currently admitted in the hospital medical services for elevated blood pressure. Patient was at her hematologist appointment for a follow-up, and she was found to have a markedly elevated blood pressure (around 220/100). She also had been experiencing several days of neurological symptoms, including visual disturbances and headache. She reports good compliance with her home regimen, which includes atenolol and lisinopril. She states that in general her blood pressure does run \"high\". She believes she was diagnosed sometime in her 25s to 35s. Her neurological symptoms seem to have resolved with careful lowering of the BP. Cardiology was asked to assess the patient due to an elevated troponin (peak 307, downtrending). Patient did have an abnormal echocardiogram in March 2022 around the time she was diagnosed with her acute pulmonary embolism. There was some evidence of RV systolic failure. As the PE was treated, repeat echocardiogram was performed months later which showed no significant findings. ROS is negative for: Chest pain, dyspnea with exertion, orthopnea, leg swelling, palpitations. Karly Kerns MD  Past Medical History:   Diagnosis Date    Acute pulmonary embolism [facility])     Chest CTA 3/16/22 - Fairly significant bilateral pulmonary emboli.     Graves'

## 2023-10-05 NOTE — PLAN OF CARE
Care plan reviewed  Problem: Discharge Planning  Goal: Discharge to home or other facility with appropriate resources  Outcome: Progressing     Problem: Pain  Goal: Verbalizes/displays adequate comfort level or baseline comfort level  Outcome: Progressing     Problem: ABCDS Injury Assessment  Goal: Absence of physical injury  Outcome: Progressing

## 2023-10-05 NOTE — PROGRESS NOTES
[facility] Hospitalist Group                                                                               Hospitalist Progress Note  Jason Aguila MD          Date of Service:  10/5/2023  NAME:  Genet Mathew  :  1980  MRN:  [id number]    Please note that this dictation was completed with Vicino, the computer voice recognition software. Quite often unanticipated grammatical, syntax, homophones, and other interpretive errors are inadvertently transcribed by the computer software. Please disregard these errors. Please excuse any errors that have escaped final proofreading. Admission Summary:   Genet Mathew is a 44 y.o. female who presents with past medical history of pulmonary embolism (unprovoked, on long-term Xarelto, diagnosed in ), hypertension, Depression/anxiety, who went to oncologist office for routine PE follow-up and was found to have elevated blood pressure and was referred to PCP office. Reffered to ED from PCP for significantly elevated blood pressure. In ED, she was noted to have some confusion and CTA head was done. It did not show any acute abnormality. Blood pressure remained elevated with SBP above 200. Patient was started on nicardipine drip and I was called for admission. Upon my interview, patient appears comfortably lying in bed and denies any significant headache, chest pain, shortness of breath or abdominal pain. The patient denies any headache, blurry vision, sore throat, trouble swallowing, trouble with speech, chest pain, SOB, cough, fever, chills, N/V/D, abd pain, urinary symptoms, constipation, recent travels, sick contacts, focal or generalized neurological symptoms, falls, injuries, rashes, contact with COVID-19 diagnosed patients, hematemesis, melena, hemoptysis, hematuria, rashes, denies starting any new medications and denies any other concerns or problems besides as mentioned above.         Interval history / Subjective:

## 2023-10-05 NOTE — PROGRESS NOTES
[facility] Admission Pharmacy Medication Reconciliation    Information obtained from:Patient  RxQuery data available1:Yes    Comments/recommendations:  Lisinopril last filled around 4 months ago, dose different from what patient reported  Only taking Xarelto about every other day due to cost  The [address] () was accessed to determine fill history of any controlled medications. NO    Medication changes (since last review): Added  None  Removed  None  Adjusted  Changed lisinopril dose from 10 to 30mg once daily  Updated Xarelto to reflect that patient only taking every other day due to cost issues  Updated trazodone directions to every night as needed for sleep       1RxQuery pharmacy benefit data reflects medications filled and processed through the patient's insurance, however                this data does NOT capture whether the medication was picked up or is currently being taken by the patient. Patient allergies:    Allergies as of 10/04/2023    (No Known Allergies)         Prior to Admission Medications   Prescriptions Last Dose Informant   acetaminophen (TYLENOL) 325 MG tablet  Self   Sig: Take 1 tablet by mouth every 4 hours as needed for Pain   atenolol (TENORMIN) 100 MG tablet 10/3/2023 Self   Sig: Take 1 tablet by mouth daily   escitalopram (LEXAPRO) 10 MG tablet 10/3/2023 Self   Sig: Take 1 tablet by mouth daily   lisinopril (PRINIVIL;ZESTRIL) 30 MG tablet 10/4/2023 Self   Sig: Take 1 tablet by mouth daily   rivaroxaban (XARELTO) 10 MG TABS tablet  Self   Sig: Take 1 tablet by mouth every 48 hours   traZODone (DESYREL) 50 MG tablet 10/3/2023 Self   Sig: Take 1 tablet by mouth nightly as needed for Sleep      Facility-Administered Medications: None          Thank you,  Tomy Moeller   PharmD Candidate

## 2023-10-05 NOTE — ED NOTES
TRANSFER - OUT REPORT:    Verbal report given to [place] RN on Anastasiia Meléndez  being transferred to PCU2 bed 1 for routine progression of patient care       Report consisted of patient's Situation, Background, Assessment and   Recommendations(SBAR). Information from the following report(s) Nurse Handoff Report, Index, ED Encounter Summary, ED SBAR, Adult Overview, Intake/Output, MAR, Recent Results, Med Rec Status, Quality Measures, and Event Log was reviewed with the receiving nurse. [place] Fall Assessment:    Presents to emergency department  because of falls (Syncope, seizure, or loss of consciousness): No  Age > 70: No  Altered Mental Status, Intoxication with alcohol or substance confusion (Disorientation, impaired judgment, poor safety awaremess, or inability to follow instructions): No  Impaired Mobility: Ambulates or transfers with assistive devices or assistance; Unable to ambulate or transer.: No  Nursing Judgement: No          Lines:   Peripheral IV 10/04/23 Left Antecubital (Active)   Site Assessment Clean, dry & intact 10/04/23 1505   Line Status Blood return noted;Brisk blood return;Capped 10/04/23 1505   Line Care Cap changed 10/04/23 1505   Phlebitis Assessment No symptoms 10/04/23 1505   Infiltration Assessment 0 10/04/23 1505   Alcohol Cap Used Yes 10/04/23 1505   Dressing Status New dressing applied 10/04/23 1505   Dressing Type Transparent 10/04/23 1505   Dressing Intervention New 10/04/23 1505        Opportunity for questions and clarification was provided.       Patient transported with:  Monitor and Registered Nurse          Keith Hernandez RN  10/04/23 5463

## 2023-10-05 NOTE — ED NOTES
Bedside and Verbal shift change report given to [place] (oncoming nurse) by Feliz Hugo RN (offgoing nurse). Report included the following information Nurse Handoff Report, Index, ED Encounter Summary, ED SBAR, Adult Overview, Intake/Output, MAR, Recent Results, Med Rec Status, Quality Measures, Neuro Assessment, and Event Log.        Betty Davila RN  10/04/23 4041 comfortable appearance

## 2023-10-05 NOTE — PROGRESS NOTES
Pt discharge home, self care. IV and telemetry discontinue. Discharge instructions given. Pt verbalized understanding. Pt voice no complaints. Pt has all of her belongings. Pt's partner will be picking her up.

## 2023-10-05 NOTE — TELEPHONE ENCOUNTER
Joe Mason at [facility]  [phone]    10/05/23- Called patient to check in, stated she was discharged from the hospital today and feeling better. BP has improved with medication adjustments. Informed her that per Dr. Irma Rosenthal from recent ED visit reviewed. Notable for anemia and thrombocytosis. I recommend repeating a CBC in a few weeks and checking some additional labs to workup these lab abnormalities. Orders placed. \"    Patient verbalized understanding, no further questions or concerns.

## 2023-10-25 ENCOUNTER — FOLLOWUP TELEPHONE ENCOUNTER (OUTPATIENT)
Facility: HOSPITAL | Age: 43
End: 2023-10-25

## 2023-10-25 NOTE — TELEPHONE ENCOUNTER
CM called patient by telephone to perform post discharge assessment and for the purpose of follow up call from inpatient discharge to check on environmental challenges/medications/appointment follow up/and questions/concerns. Pt did not answer the phone. CM left a HIPPA compliant VM and provided call back number. CM to attempt a second call.     Salomon Henson, [address] [place] [address],   [phone]

## 2023-11-07 ENCOUNTER — FOLLOWUP TELEPHONE ENCOUNTER (OUTPATIENT)
Facility: HOSPITAL | Age: 43
End: 2023-11-07

## 2023-11-07 NOTE — TELEPHONE ENCOUNTER
CM called patient by telephone to perform post discharge assessment and for the purpose of follow up call from inpatient discharge to check on environmental challenges/medications/appointment follow up/and questions/concerns. Pt did not answer the phone. CM left a HIPPA compliant VM and provided call back number. This was CM's second attempt. CM to close case and will reopen upon further contact from pt.      Syeda Richmond, [address] [place] [address],   [phone]

## 2024-09-20 ENCOUNTER — TELEPHONE (OUTPATIENT)
Age: 44
End: 2024-09-20

## 2025-02-20 ENCOUNTER — HOSPITAL ENCOUNTER (EMERGENCY)
Facility: HOSPITAL | Age: 45
Discharge: HOME | End: 2025-02-20
Attending: STUDENT IN AN ORGANIZED HEALTH CARE EDUCATION/TRAINING PROGRAM
Payer: COMMERCIAL

## 2025-02-20 VITALS
SYSTOLIC BLOOD PRESSURE: 163 MMHG | TEMPERATURE: 98.4 F | HEART RATE: 65 BPM | WEIGHT: 183 LBS | OXYGEN SATURATION: 97 % | HEIGHT: 64 IN | BODY MASS INDEX: 31.24 KG/M2 | DIASTOLIC BLOOD PRESSURE: 94 MMHG | RESPIRATION RATE: 17 BRPM

## 2025-02-20 DIAGNOSIS — I10 HYPERTENSION, UNSPECIFIED TYPE: Primary | ICD-10-CM

## 2025-02-20 LAB
ALBUMIN SERPL-MCNC: 3.7 G/DL (ref 3.5–5)
ALBUMIN/GLOB SERPL: 0.8 (ref 1.1–2.2)
ALP SERPL-CCNC: 84 U/L (ref 45–117)
ALT SERPL-CCNC: 29 U/L (ref 12–78)
ANION GAP SERPL CALC-SCNC: 7 MMOL/L (ref 2–12)
AST SERPL-CCNC: 19 U/L (ref 15–37)
BASOPHILS # BLD: 0.08 K/UL (ref 0–0.1)
BASOPHILS NFR BLD: 1 % (ref 0–1)
BILIRUB SERPL-MCNC: 0.4 MG/DL (ref 0.2–1)
BUN SERPL-MCNC: 9 MG/DL (ref 6–20)
BUN/CREAT SERPL: 13 (ref 12–20)
CALCIUM SERPL-MCNC: 8.9 MG/DL (ref 8.5–10.1)
CHLORIDE SERPL-SCNC: 105 MMOL/L (ref 97–108)
CO2 SERPL-SCNC: 26 MMOL/L (ref 21–32)
COMMENT:: NORMAL
CREAT SERPL-MCNC: 0.69 MG/DL (ref 0.55–1.02)
DIFFERENTIAL METHOD BLD: ABNORMAL
EKG ATRIAL RATE: 76 BPM
EKG DIAGNOSIS: NORMAL
EKG P AXIS: 53 DEGREES
EKG P-R INTERVAL: 156 MS
EKG Q-T INTERVAL: 398 MS
EKG QRS DURATION: 84 MS
EKG QTC CALCULATION (BAZETT): 447 MS
EKG R AXIS: 26 DEGREES
EKG T AXIS: 17 DEGREES
EKG VENTRICULAR RATE: 76 BPM
EOSINOPHIL # BLD: 0.1 K/UL (ref 0–0.4)
EOSINOPHIL NFR BLD: 1.2 % (ref 0–7)
ERYTHROCYTE [DISTWIDTH] IN BLOOD BY AUTOMATED COUNT: 19 % (ref 11.5–14.5)
GLOBULIN SER CALC-MCNC: 4.6 G/DL (ref 2–4)
GLUCOSE SERPL-MCNC: 105 MG/DL (ref 65–100)
HCT VFR BLD AUTO: 34.9 % (ref 35–47)
HGB BLD-MCNC: 10.7 G/DL (ref 11.5–16)
IMM GRANULOCYTES # BLD AUTO: 0.02 K/UL (ref 0–0.04)
IMM GRANULOCYTES NFR BLD AUTO: 0.2 % (ref 0–0.5)
LYMPHOCYTES # BLD: 3.45 K/UL (ref 0.8–3.5)
LYMPHOCYTES NFR BLD: 41.9 % (ref 12–49)
MCH RBC QN AUTO: 23.4 PG (ref 26–34)
MCHC RBC AUTO-ENTMCNC: 30.7 G/DL (ref 30–36.5)
MCV RBC AUTO: 76.2 FL (ref 80–99)
MONOCYTES # BLD: 0.67 K/UL (ref 0–1)
MONOCYTES NFR BLD: 8.1 % (ref 5–13)
NEUTS SEG # BLD: 3.91 K/UL (ref 1.8–8)
NEUTS SEG NFR BLD: 47.6 % (ref 32–75)
NRBC # BLD: 0 K/UL (ref 0–0.01)
NRBC BLD-RTO: 0 PER 100 WBC
PLATELET # BLD AUTO: 428 K/UL (ref 150–400)
PMV BLD AUTO: 10.1 FL (ref 8.9–12.9)
POTASSIUM SERPL-SCNC: 3.3 MMOL/L (ref 3.5–5.1)
PROT SERPL-MCNC: 8.3 G/DL (ref 6.4–8.2)
RBC # BLD AUTO: 4.58 M/UL (ref 3.8–5.2)
SODIUM SERPL-SCNC: 138 MMOL/L (ref 136–145)
SPECIMEN HOLD: NORMAL
WBC # BLD AUTO: 8.2 K/UL (ref 3.6–11)

## 2025-02-20 PROCEDURE — 99284 EMERGENCY DEPT VISIT MOD MDM: CPT

## 2025-02-20 PROCEDURE — 80053 COMPREHEN METABOLIC PANEL: CPT

## 2025-02-20 PROCEDURE — 93010 ELECTROCARDIOGRAM REPORT: CPT | Performed by: SPECIALIST

## 2025-02-20 PROCEDURE — 93005 ELECTROCARDIOGRAM TRACING: CPT | Performed by: STUDENT IN AN ORGANIZED HEALTH CARE EDUCATION/TRAINING PROGRAM

## 2025-02-20 PROCEDURE — 6370000000 HC RX 637 (ALT 250 FOR IP)

## 2025-02-20 PROCEDURE — 85025 COMPLETE CBC W/AUTO DIFF WBC: CPT

## 2025-02-20 PROCEDURE — 36415 COLL VENOUS BLD VENIPUNCTURE: CPT

## 2025-02-20 RX ORDER — ATENOLOL 50 MG/1
50 TABLET ORAL DAILY
Qty: 30 TABLET | Refills: 1 | Status: SHIPPED | OUTPATIENT
Start: 2025-02-20

## 2025-02-20 RX ORDER — LISINOPRIL 20 MG/1
20 TABLET ORAL DAILY
Status: DISCONTINUED | OUTPATIENT
Start: 2025-02-20 | End: 2025-02-20 | Stop reason: HOSPADM

## 2025-02-20 RX ORDER — AMLODIPINE BESYLATE 10 MG/1
10 TABLET ORAL DAILY
Qty: 30 TABLET | Refills: 1 | Status: SHIPPED | OUTPATIENT
Start: 2025-02-20

## 2025-02-20 RX ORDER — AMLODIPINE BESYLATE 5 MG/1
10 TABLET ORAL DAILY
Status: DISCONTINUED | OUTPATIENT
Start: 2025-02-20 | End: 2025-02-20 | Stop reason: HOSPADM

## 2025-02-20 RX ORDER — LISINOPRIL 20 MG/1
20 TABLET ORAL DAILY
Qty: 30 TABLET | Refills: 1 | Status: SHIPPED | OUTPATIENT
Start: 2025-02-20

## 2025-02-20 RX ORDER — ATENOLOL 50 MG/1
50 TABLET ORAL DAILY
Status: DISCONTINUED | OUTPATIENT
Start: 2025-02-20 | End: 2025-02-20 | Stop reason: HOSPADM

## 2025-02-20 RX ADMIN — ATENOLOL 50 MG: 50 TABLET ORAL at 15:29

## 2025-02-20 RX ADMIN — AMLODIPINE BESYLATE 10 MG: 5 TABLET ORAL at 15:30

## 2025-02-20 RX ADMIN — LISINOPRIL 20 MG: 20 TABLET ORAL at 15:30

## 2025-02-20 ASSESSMENT — PAIN - FUNCTIONAL ASSESSMENT: PAIN_FUNCTIONAL_ASSESSMENT: 0-10

## 2025-02-20 ASSESSMENT — PAIN DESCRIPTION - LOCATION: LOCATION: HEAD

## 2025-02-20 ASSESSMENT — PAIN SCALES - GENERAL: PAINLEVEL_OUTOF10: 5

## 2025-02-20 NOTE — ED TRIAGE NOTES
Pt arrives to the ER for complaints of a headache and elevated blood pressure. Pt reports that she has also been experiencing blurry vision that started a week ago.      Pt reports that she does have a h/o HTN. Pt states that she has not taken her blood pressure medication in a few months due to insurance.     Pt reports that she has intermittent extremity loss of sensation but denies any currently.

## 2025-02-21 NOTE — ED PROVIDER NOTES
[facility] EMERGENCY DEPARTMENT  EMERGENCY DEPARTMENT ENCOUNTER      Pt Name: Crystal Campbell  MRN: [id number]  Birthdate 1980  Date of evaluation: 2/20/2025  Provider: Frederic Myers MD    CHIEF COMPLAINT       Chief Complaint   Patient presents with    Headache    Hypertension         HISTORY OF PRESENT ILLNESS   (Location/Symptom, Timing/Onset, Context/Setting, Quality, Duration, Modifying Factors, Severity)  Note limiting factors.   This patient is a 44-year-old female that presents to the [facility] emergency department for concerns of elevated blood pressure.  The patient states that around 2 months ago the patient lost insurance through her job and as such cannot pay for any of her blood pressure medications.  Since then she has not been taking her blood pressure medicines regularly.  It has been 2 months since the patient last took her full course of blood pressure meds.  Patient states that for the last 2 weeks she has been having symptoms including transient headache and vision changes.  The patient describes her headache as a \"dull\" aching pain.  She does not endorse any chest pain, shortness of breath, hearing changes, seizures, or strokes.  The patient has an appointment with her PCP in late March but decided to come to the emergency department because she has new insurance and felt that it would be for a poor decision of hers to stay off of her blood pressure medication until then.    The history is provided by the patient. No  was used.         Review of External Medical Records:     Nursing Notes were reviewed.    REVIEW OF SYSTEMS    (2-9 systems for level 4, 10 or more for level 5)     Review of Systems    Except as noted above the remainder of the review of systems was reviewed and negative.       PAST MEDICAL HISTORY     Past Medical History:   Diagnosis Date    Acute pulmonary embolism (HCC)     Chest CTA 3/16/22 - Fairly significant bilateral

## 2025-03-23 SDOH — HEALTH STABILITY: PHYSICAL HEALTH: ON AVERAGE, HOW MANY DAYS PER WEEK DO YOU ENGAGE IN MODERATE TO STRENUOUS EXERCISE (LIKE A BRISK WALK)?: 1 DAY

## 2025-03-23 SDOH — HEALTH STABILITY: PHYSICAL HEALTH: ON AVERAGE, HOW MANY MINUTES DO YOU ENGAGE IN EXERCISE AT THIS LEVEL?: 20 MIN

## 2025-03-24 NOTE — PATIENT INSTRUCTIONS
much sun, wash your hands, brush your teeth twice a day, and wear a seat belt in the car.   Where can you learn more?  Go to [email or web address]/patientEd and enter P072 to learn more about \"Well Visit, Ages 18 to 65: Care Instructions.\"  Current as of: April 30, 2024  Content Version: 14.4  © 0850-0036 [employer].   Care instructions adapted under license by [employer]. If you have questions about a medical condition or this instruction, always ask your healthcare professional. [employer] disclaims any warranty or liability for your use of this information.

## 2025-03-24 NOTE — PROGRESS NOTES
3/26/2025    Crystal Campbell (:  1980) is a 44 y.o. female, here for a preventive medicine evaluation.    Subjective   Patient Active Problem List   Diagnosis    Pulmonary emboli (HCC)    Hypertensive urgency     Patient is new to this provider    PMHx: HTN, h/o PE, left periophthalmic ICA aneurysm    HIV screen: Patient is amenable   Hep C screen: Patient is amenable    Patient's last menstrual period was 2025.    Cervical cancer screening  Last Pap: n/a  Result: n/a  Hx of abnormal Pap: n/a    Breast cancer screening  Mammogram: amenable    Hypertension  Patient is a PMHx of HTN being managed with medications listed below.  Her BP is noted to be WNL at 115/73.    Current medications: Lisinopril 20 mg, atenolol 50 mg, amlodipine 10 mg    Health Maintenance Due   Topic Date Due    Depression Screen  Never done    Varicella vaccine (1 of 2 - 13+ 2-dose series) Never done    HIV screen  Never done    Hepatitis C screen  Never done    Hepatitis B vaccine (1 of 3 - 19+ 3-dose series) Never done    DTaP/Tdap/Td vaccine (1 - Tdap) Never done    Cervical cancer screen  Never done    Diabetes screen  Never done    Lipids  Never done    Breast cancer screen  2023    Flu vaccine (1) Never done    COVID-19 Vaccine ( - - season) Never done         Review of Systems   Constitutional:  Negative for activity change and appetite change.   HENT:  Negative for congestion.    Respiratory:  Negative for cough and shortness of breath.    Cardiovascular:  Negative for leg swelling.   Musculoskeletal:  Negative for arthralgias.   All other systems reviewed and are negative.      Prior to Visit Medications    Medication Sig Taking? Authorizing Provider   amLODIPine (NORVASC) 10 MG tablet Take 1 tablet by mouth daily  Frederic Myers MD   atenolol (TENORMIN) 50 MG tablet Take 1 tablet by mouth daily  Frederic Myers MD   lisinopril (PRINIVIL;ZESTRIL) 20 MG tablet Take 1 tablet by mouth daily  Frederic Myers MD

## 2025-03-26 ENCOUNTER — OFFICE VISIT (OUTPATIENT)
Facility: CLINIC | Age: 45
End: 2025-03-26
Payer: COMMERCIAL

## 2025-03-26 VITALS
RESPIRATION RATE: 18 BRPM | BODY MASS INDEX: 28.68 KG/M2 | TEMPERATURE: 97.8 F | DIASTOLIC BLOOD PRESSURE: 73 MMHG | OXYGEN SATURATION: 100 % | WEIGHT: 168 LBS | HEIGHT: 64 IN | HEART RATE: 75 BPM | SYSTOLIC BLOOD PRESSURE: 115 MMHG

## 2025-03-26 DIAGNOSIS — Z11.59 NEED FOR HEPATITIS C SCREENING TEST: ICD-10-CM

## 2025-03-26 DIAGNOSIS — Z11.4 SCREENING FOR HIV WITHOUT PRESENCE OF RISK FACTORS: ICD-10-CM

## 2025-03-26 DIAGNOSIS — I10 PRIMARY HYPERTENSION: ICD-10-CM

## 2025-03-26 DIAGNOSIS — E87.6 HYPOKALEMIA: ICD-10-CM

## 2025-03-26 DIAGNOSIS — Z86.39 HISTORY OF GRAVES' DISEASE: ICD-10-CM

## 2025-03-26 DIAGNOSIS — Z86.711 HISTORY OF PULMONARY EMBOLUS (PE): ICD-10-CM

## 2025-03-26 DIAGNOSIS — Z12.31 ENCOUNTER FOR SCREENING MAMMOGRAM FOR MALIGNANT NEOPLASM OF BREAST: ICD-10-CM

## 2025-03-26 DIAGNOSIS — Z00.00 ENCOUNTER FOR PREVENTIVE HEALTH EXAMINATION: ICD-10-CM

## 2025-03-26 DIAGNOSIS — D50.9 MICROCYTIC ANEMIA: ICD-10-CM

## 2025-03-26 DIAGNOSIS — Z76.89 ENCOUNTER TO ESTABLISH CARE: Primary | ICD-10-CM

## 2025-03-26 PROBLEM — I26.99 PULMONARY EMBOLI: Status: RESOLVED | Noted: 2022-03-16 | Resolved: 2025-03-26

## 2025-03-26 PROBLEM — I16.0 HYPERTENSIVE URGENCY: Status: RESOLVED | Noted: 2023-10-04 | Resolved: 2025-03-26

## 2025-03-26 LAB
25(OH)D3 SERPL-MCNC: <9 NG/ML (ref 30–100)
ALBUMIN SERPL-MCNC: 4.2 G/DL (ref 3.5–5)
ALBUMIN/GLOB SERPL: 0.9 (ref 1.1–2.2)
ALP SERPL-CCNC: 77 U/L (ref 45–117)
ALT SERPL-CCNC: 34 U/L (ref 12–78)
ANION GAP SERPL CALC-SCNC: 7 MMOL/L (ref 2–12)
AST SERPL-CCNC: 19 U/L (ref 15–37)
BASOPHILS # BLD: 0.09 K/UL (ref 0–0.1)
BASOPHILS NFR BLD: 1.4 % (ref 0–1)
BILIRUB SERPL-MCNC: 0.4 MG/DL (ref 0.2–1)
BUN SERPL-MCNC: 12 MG/DL (ref 6–20)
BUN/CREAT SERPL: 14 (ref 12–20)
CALCIUM SERPL-MCNC: 9.7 MG/DL (ref 8.5–10.1)
CHLORIDE SERPL-SCNC: 105 MMOL/L (ref 97–108)
CHOLEST SERPL-MCNC: 212 MG/DL
CO2 SERPL-SCNC: 21 MMOL/L (ref 21–32)
CREAT SERPL-MCNC: 0.85 MG/DL (ref 0.55–1.02)
DIFFERENTIAL METHOD BLD: ABNORMAL
EOSINOPHIL # BLD: 0.03 K/UL (ref 0–0.4)
EOSINOPHIL NFR BLD: 0.5 % (ref 0–7)
ERYTHROCYTE [DISTWIDTH] IN BLOOD BY AUTOMATED COUNT: 19.5 % (ref 11.5–14.5)
EST. AVERAGE GLUCOSE BLD GHB EST-MCNC: 157 MG/DL
FERRITIN SERPL-MCNC: 8 NG/ML (ref 26–388)
FOLATE SERPL-MCNC: 9.6 NG/ML (ref 5–21)
GLOBULIN SER CALC-MCNC: 4.7 G/DL (ref 2–4)
GLUCOSE SERPL-MCNC: 159 MG/DL (ref 65–100)
HBA1C MFR BLD: 7.1 % (ref 4–5.6)
HCT VFR BLD AUTO: 38.4 % (ref 35–47)
HCV AB SER IA-ACNC: 0.1 INDEX
HCV AB SERPL QL IA: NONREACTIVE
HDLC SERPL-MCNC: 80 MG/DL
HDLC SERPL: 2.7 (ref 0–5)
HGB BLD-MCNC: 11.8 G/DL (ref 11.5–16)
HIV 1+2 AB+HIV1 P24 AG SERPL QL IA: NONREACTIVE
HIV 1/2 RESULT COMMENT: NORMAL
IMM GRANULOCYTES # BLD AUTO: 0.02 K/UL (ref 0–0.04)
IMM GRANULOCYTES NFR BLD AUTO: 0.3 % (ref 0–0.5)
IRON SATN MFR SERPL: 17 % (ref 20–50)
IRON SERPL-MCNC: 102 UG/DL (ref 35–150)
LDLC SERPL CALC-MCNC: 100 MG/DL (ref 0–100)
LYMPHOCYTES # BLD: 2.72 K/UL (ref 0.8–3.5)
LYMPHOCYTES NFR BLD: 41.7 % (ref 12–49)
MCH RBC QN AUTO: 23.3 PG (ref 26–34)
MCHC RBC AUTO-ENTMCNC: 30.7 G/DL (ref 30–36.5)
MCV RBC AUTO: 75.7 FL (ref 80–99)
MONOCYTES # BLD: 0.57 K/UL (ref 0–1)
MONOCYTES NFR BLD: 8.7 % (ref 5–13)
NEUTS SEG # BLD: 3.1 K/UL (ref 1.8–8)
NEUTS SEG NFR BLD: 47.4 % (ref 32–75)
NRBC # BLD: 0 K/UL (ref 0–0.01)
NRBC BLD-RTO: 0 PER 100 WBC
PLATELET # BLD AUTO: 455 K/UL (ref 150–400)
PMV BLD AUTO: 10.6 FL (ref 8.9–12.9)
POTASSIUM SERPL-SCNC: 4.9 MMOL/L (ref 3.5–5.1)
PROT SERPL-MCNC: 8.9 G/DL (ref 6.4–8.2)
RBC # BLD AUTO: 5.07 M/UL (ref 3.8–5.2)
SODIUM SERPL-SCNC: 133 MMOL/L (ref 136–145)
T4 FREE SERPL-MCNC: 1.2 NG/DL (ref 0.8–1.5)
TIBC SERPL-MCNC: 613 UG/DL (ref 250–450)
TRIGL SERPL-MCNC: 160 MG/DL
TSH SERPL DL<=0.05 MIU/L-ACNC: 0.63 UIU/ML (ref 0.36–3.74)
VIT B12 SERPL-MCNC: 238 PG/ML (ref 193–986)
VLDLC SERPL CALC-MCNC: 32 MG/DL
WBC # BLD AUTO: 6.5 K/UL (ref 3.6–11)

## 2025-03-26 PROCEDURE — 3074F SYST BP LT 130 MM HG: CPT | Performed by: STUDENT IN AN ORGANIZED HEALTH CARE EDUCATION/TRAINING PROGRAM

## 2025-03-26 PROCEDURE — 3078F DIAST BP <80 MM HG: CPT | Performed by: STUDENT IN AN ORGANIZED HEALTH CARE EDUCATION/TRAINING PROGRAM

## 2025-03-26 PROCEDURE — 99204 OFFICE O/P NEW MOD 45 MIN: CPT | Performed by: STUDENT IN AN ORGANIZED HEALTH CARE EDUCATION/TRAINING PROGRAM

## 2025-03-26 PROCEDURE — 99386 PREV VISIT NEW AGE 40-64: CPT | Performed by: STUDENT IN AN ORGANIZED HEALTH CARE EDUCATION/TRAINING PROGRAM

## 2025-03-26 SDOH — ECONOMIC STABILITY: FOOD INSECURITY: WITHIN THE PAST 12 MONTHS, YOU WORRIED THAT YOUR FOOD WOULD RUN OUT BEFORE YOU GOT MONEY TO BUY MORE.: NEVER TRUE

## 2025-03-26 SDOH — ECONOMIC STABILITY: FOOD INSECURITY: WITHIN THE PAST 12 MONTHS, THE FOOD YOU BOUGHT JUST DIDN'T LAST AND YOU DIDN'T HAVE MONEY TO GET MORE.: NEVER TRUE

## 2025-03-26 ASSESSMENT — PATIENT HEALTH QUESTIONNAIRE - PHQ9
2. FEELING DOWN, DEPRESSED OR HOPELESS: NOT AT ALL
SUM OF ALL RESPONSES TO PHQ QUESTIONS 1-9: 0
1. LITTLE INTEREST OR PLEASURE IN DOING THINGS: NOT AT ALL
SUM OF ALL RESPONSES TO PHQ QUESTIONS 1-9: 0

## 2025-03-26 ASSESSMENT — ENCOUNTER SYMPTOMS
COUGH: 0
SHORTNESS OF BREATH: 0

## 2025-03-26 NOTE — ASSESSMENT & PLAN NOTE
-Labs ordered    Orders:    Vitamin D 25 Hydroxy; Future    Hemoglobin A1C; Future    T4, Free; Future    TSH; Future    Comprehensive Metabolic Panel; Future    CBC with Auto Differential; Future    Lipid Panel; Future    Ferritin; Future    Iron and TIBC; Future    Vitamin B12; Future    Folate; Future

## 2025-03-26 NOTE — ASSESSMENT & PLAN NOTE
-PMHx of HTN  -Current medications: Lisinopril 20 mg, atenolol 50 mg, amlodipine 10 mg  -/73  -C/W current regimen    Orders:    Hemoglobin A1C; Future    T4, Free; Future    TSH; Future    Comprehensive Metabolic Panel; Future    CBC with Auto Differential; Future    Lipid Panel; Future

## 2025-03-26 NOTE — ASSESSMENT & PLAN NOTE
-PMHx of PE  -Not currently taking Xarelto  -Reports talking to hematology/oncology upcoming appointment  -Advised to wear compression stockings & prevent prolonged periods of being sedentary

## 2025-03-27 ENCOUNTER — RESULTS FOLLOW-UP (OUTPATIENT)
Facility: CLINIC | Age: 45
End: 2025-03-27

## 2025-04-02 DIAGNOSIS — E55.9 VITAMIN D DEFICIENCY: Primary | ICD-10-CM

## 2025-04-02 RX ORDER — ERGOCALCIFEROL 1.25 MG/1
50000 CAPSULE, LIQUID FILLED ORAL WEEKLY
Qty: 12 CAPSULE | Refills: 3 | Status: SHIPPED | OUTPATIENT
Start: 2025-04-02

## 2025-04-09 ENCOUNTER — OFFICE VISIT (OUTPATIENT)
Age: 45
End: 2025-04-09
Payer: COMMERCIAL

## 2025-04-09 VITALS
BODY MASS INDEX: 28.48 KG/M2 | RESPIRATION RATE: 16 BRPM | SYSTOLIC BLOOD PRESSURE: 153 MMHG | DIASTOLIC BLOOD PRESSURE: 93 MMHG | HEART RATE: 68 BPM | HEIGHT: 64 IN | OXYGEN SATURATION: 98 % | WEIGHT: 166.8 LBS | TEMPERATURE: 97.3 F

## 2025-04-09 DIAGNOSIS — D64.9 ANEMIA, UNSPECIFIED TYPE: ICD-10-CM

## 2025-04-09 DIAGNOSIS — I26.99 OTHER ACUTE PULMONARY EMBOLISM WITHOUT ACUTE COR PULMONALE: Primary | ICD-10-CM

## 2025-04-09 PROCEDURE — 3080F DIAST BP >= 90 MM HG: CPT | Performed by: INTERNAL MEDICINE

## 2025-04-09 PROCEDURE — 3077F SYST BP >= 140 MM HG: CPT | Performed by: INTERNAL MEDICINE

## 2025-04-09 PROCEDURE — 99214 OFFICE O/P EST MOD 30 MIN: CPT | Performed by: INTERNAL MEDICINE

## 2025-04-09 ASSESSMENT — PATIENT HEALTH QUESTIONNAIRE - PHQ9
2. FEELING DOWN, DEPRESSED OR HOPELESS: NOT AT ALL
SUM OF ALL RESPONSES TO PHQ QUESTIONS 1-9: 0
1. LITTLE INTEREST OR PLEASURE IN DOING THINGS: NOT AT ALL

## 2025-04-09 NOTE — PROGRESS NOTES
Crystal Campbell is a 44 y.o. female follow up for         1. Have you been to the ER, urgent care clinic since your last visit?  Hospitalized since your last visit?{no    2. Have you seen or consulted any other health care providers outside of the [facility] since your last visit?  Include any pap smears or colon screening. PCP  
FERRITIN 8 (L) 03/26/2025    [id number] 03/26/2025    FOLATE 9.6 03/26/2025    HEPCAB 0.10 03/26/2025    [id number] NONREACTIVE 03/26/2025     Lab Results   Component Value Date    APTT 71.3 (H) 03/19/2022    TSH 0.63 03/26/2025        Bilateral LE Doppler 3/16/2022: negative      CTA Chest 3/16/2022:   Fairly significant bilateral pulmonary emboli.    Assessment:   Pulmonary embolism  Diagnosed 3/16/2022.  No identifiable risk factors at that time. Treated with apixaban for over 6 months, then transitioned to rivaroxaban 10mg daily. She stopped this on her own around 4/2024.     As her event was unprovoked, I have recommended long-term anticoagulation. See initial consult note for details. We reviewed the risks and benefits of anticoagulation again today. She agrees to resume rivaroxaban 10mg PO daily, which she is tolerating well. Recent labs done in the ED reviewed and ok for continued therapy.      Iron deficiency anemia  Recent iron levels are quite low. I recommend she start oral iron.     The cause of her iron deficiency is likely menstrual blood loss. However, menses have been normal since stopping rivaroxaban, but iron deficiency is persisting. She denies obvious GI bleeding, though she has had some intermittent dark stools. She has never had a colonoscopy. She would like referral to GI for evaluation.    If she has colonoscopy, I recommend she hold on Xarelto the day of the procedure and 2 days prior.      Thrombocytosis  Likely due to iron deficiency. Treat as above.      Borderline B12 deficiency  Start B12 1000mcg PO daily. Monitor.      HTN  Improved today, back on medication and following with her PCP.       Abnormal TTE  TTE demonstrated RV with moderate to severe dilation and dysfunction.  Likely due to PE. Repeat TTE with Dr. Ramos in 7/2022 was normal.      Plan:     Resume rivaroxaban 10mg PO daily  Start B12 1000mcg PO daily  Start ferrous sulfate daily or every other day, take with Vitamin

## 2025-04-09 NOTE — PATIENT INSTRUCTIONS
professional. [facility] [employer] [facility], disclaims any warranty or liability for your use of this information.

## 2025-04-18 RX ORDER — LISINOPRIL 20 MG/1
20 TABLET ORAL DAILY
Qty: 30 TABLET | Refills: 0 | Status: SHIPPED | OUTPATIENT
Start: 2025-04-18

## 2025-04-18 RX ORDER — AMLODIPINE BESYLATE 10 MG/1
10 TABLET ORAL DAILY
Qty: 30 TABLET | Refills: 0 | Status: SHIPPED | OUTPATIENT
Start: 2025-04-18

## 2025-04-18 RX ORDER — ATENOLOL 50 MG/1
50 TABLET ORAL DAILY
Qty: 30 TABLET | Refills: 0 | Status: SHIPPED | OUTPATIENT
Start: 2025-04-18

## 2025-04-23 ENCOUNTER — TELEPHONE (OUTPATIENT)
Age: 45
End: 2025-04-23

## 2025-04-23 NOTE — TELEPHONE ENCOUNTER
Spoke with the Gastro office about this patients referral to see if she has been scheduled. Their office stated they have tried calling the patient several times with no answer or call back and they sent a letter to our office letting us know that they have been unable to contact her. Just and ARPITA for the team.

## 2025-05-19 RX ORDER — ATENOLOL 50 MG/1
50 TABLET ORAL DAILY
Qty: 30 TABLET | Refills: 2 | Status: SHIPPED | OUTPATIENT
Start: 2025-05-19

## 2025-05-19 RX ORDER — AMLODIPINE BESYLATE 10 MG/1
10 TABLET ORAL DAILY
Qty: 30 TABLET | Refills: 2 | Status: SHIPPED | OUTPATIENT
Start: 2025-05-19

## 2025-05-23 RX ORDER — LISINOPRIL 20 MG/1
20 TABLET ORAL DAILY
Qty: 90 TABLET | Refills: 1 | Status: SHIPPED | OUTPATIENT
Start: 2025-05-23

## 2025-05-29 ENCOUNTER — HOSPITAL ENCOUNTER (OUTPATIENT)
Facility: HOSPITAL | Age: 45
Discharge: HOME | End: 2025-05-29
Attending: STUDENT IN AN ORGANIZED HEALTH CARE EDUCATION/TRAINING PROGRAM
Payer: COMMERCIAL

## 2025-05-29 VITALS — HEIGHT: 64 IN | BODY MASS INDEX: 28.34 KG/M2 | WEIGHT: 166 LBS

## 2025-05-29 DIAGNOSIS — Z00.00 ENCOUNTER FOR PREVENTIVE HEALTH EXAMINATION: ICD-10-CM

## 2025-05-29 DIAGNOSIS — Z12.31 ENCOUNTER FOR SCREENING MAMMOGRAM FOR MALIGNANT NEOPLASM OF BREAST: ICD-10-CM

## 2025-05-29 PROCEDURE — 77063 BREAST TOMOSYNTHESIS BI: CPT

## 2025-06-26 ENCOUNTER — OFFICE VISIT (OUTPATIENT)
Facility: CLINIC | Age: 45
End: 2025-06-26
Payer: COMMERCIAL

## 2025-06-26 VITALS
WEIGHT: 166.2 LBS | SYSTOLIC BLOOD PRESSURE: 134 MMHG | BODY MASS INDEX: 28.38 KG/M2 | TEMPERATURE: 97.6 F | HEART RATE: 76 BPM | HEIGHT: 64 IN | OXYGEN SATURATION: 100 % | DIASTOLIC BLOOD PRESSURE: 72 MMHG | RESPIRATION RATE: 18 BRPM

## 2025-06-26 DIAGNOSIS — Z86.711 HISTORY OF PULMONARY EMBOLUS (PE): Primary | ICD-10-CM

## 2025-06-26 DIAGNOSIS — I10 PRIMARY HYPERTENSION: ICD-10-CM

## 2025-06-26 DIAGNOSIS — E11.65 TYPE 2 DIABETES MELLITUS WITH HYPERGLYCEMIA, WITHOUT LONG-TERM CURRENT USE OF INSULIN (HCC): ICD-10-CM

## 2025-06-26 DIAGNOSIS — N92.1 MENORRHAGIA WITH IRREGULAR CYCLE: ICD-10-CM

## 2025-06-26 DIAGNOSIS — D50.9 IRON DEFICIENCY ANEMIA, UNSPECIFIED IRON DEFICIENCY ANEMIA TYPE: ICD-10-CM

## 2025-06-26 DIAGNOSIS — E78.2 MIXED HYPERLIPIDEMIA: ICD-10-CM

## 2025-06-26 PROCEDURE — 3075F SYST BP GE 130 - 139MM HG: CPT | Performed by: STUDENT IN AN ORGANIZED HEALTH CARE EDUCATION/TRAINING PROGRAM

## 2025-06-26 PROCEDURE — 3051F HG A1C>EQUAL 7.0%<8.0%: CPT | Performed by: STUDENT IN AN ORGANIZED HEALTH CARE EDUCATION/TRAINING PROGRAM

## 2025-06-26 PROCEDURE — 3078F DIAST BP <80 MM HG: CPT | Performed by: STUDENT IN AN ORGANIZED HEALTH CARE EDUCATION/TRAINING PROGRAM

## 2025-06-26 PROCEDURE — 99214 OFFICE O/P EST MOD 30 MIN: CPT | Performed by: STUDENT IN AN ORGANIZED HEALTH CARE EDUCATION/TRAINING PROGRAM

## 2025-06-26 RX ORDER — HYDROXYZINE HYDROCHLORIDE 25 MG/1
25 TABLET, FILM COATED ORAL
COMMUNITY
Start: 2025-06-25

## 2025-06-26 RX ORDER — NORETHINDRONE 5 MG/1
5 TABLET ORAL DAILY
Qty: 30 TABLET | Refills: 0 | Status: SHIPPED | OUTPATIENT
Start: 2025-06-26

## 2025-06-26 SDOH — ECONOMIC STABILITY: FOOD INSECURITY: WITHIN THE PAST 12 MONTHS, YOU WORRIED THAT YOUR FOOD WOULD RUN OUT BEFORE YOU GOT MONEY TO BUY MORE.: NEVER TRUE

## 2025-06-26 SDOH — ECONOMIC STABILITY: FOOD INSECURITY: WITHIN THE PAST 12 MONTHS, THE FOOD YOU BOUGHT JUST DIDN'T LAST AND YOU DIDN'T HAVE MONEY TO GET MORE.: NEVER TRUE

## 2025-06-26 ASSESSMENT — PATIENT HEALTH QUESTIONNAIRE - PHQ9
1. LITTLE INTEREST OR PLEASURE IN DOING THINGS: NOT AT ALL
SUM OF ALL RESPONSES TO PHQ QUESTIONS 1-9: 0
2. FEELING DOWN, DEPRESSED OR HOPELESS: NOT AT ALL

## 2025-06-26 NOTE — PROGRESS NOTES
Chief Complaint   Patient presents with    3 Month Follow-Up     Have you been to the ER, urgent care clinic since your last visit?  Hospitalized since your last visit?   NO    Have you seen or consulted any other health care providers outside our system since your last visit?   No     “Have you had a pap smear?”    NO    No cervical cancer screening on file       “Have you had a diabetic eye exam?”    YES - Where: \"I don't know\" Nurse/CMA to request most recent records if not in the chart     No diabetic eye exam on file

## 2025-06-26 NOTE — PROGRESS NOTES
Crystal Campbell (:  1980) is a 44 y.o. female, Established patient, here for evaluation of the following chief complaint(s):  3 Month Follow-Up         Assessment & Plan  1.  History of PE.  - Currently on Xarelto 10 mg.  Follows with hematology who recommends long-term anticoagulation.    2.  Iron deficiency anemia.  - PMHx of JEFF.  Instructed by hematology to take oral iron supplementation.  Patient states that she takes inconsistently due to undesired side effects (nausea).  - Referred to GI for EGD/colonoscopy (2025)    3.  T2DM.  - Recently diagnosed with T2DM.  A1c was 7.1%.  - Opted for lifestyle and dietary modifications.  - Discussed medication management if A1c increased or not improved.  - Will repeat labs.    4.  Hyperlipidemia.  - Elevated cholesterol identified on most recent labs.  - Discussed dietary and lifestyle modifications.  - If no improvement and with newly diagnosed T2DM, we will start cholesterol medication.    5.  Menorrhagia.  - Endorses heavy and irregular menses, particularly since starting Xarelto.  - Discussed hormonal therapy to help with the management of symptoms.  - Will start norethindrone 5 mg daily.  Can increase to 10 mg if there is no symptom improvement within 3-5 days.    6.  Hypertension.  - BP well-controlled.  - C/W current regimen.  Results    1. History of pulmonary embolus (PE)  2. Iron deficiency anemia, unspecified iron deficiency anemia type  -     Ferritin; Future  -     Iron and TIBC; Future  -     Vitamin B12; Future  -     Folate; Future  3. Type 2 diabetes mellitus with hyperglycemia, without long-term current use of insulin (HCC)  -     Hemoglobin A1C; Future  -     Albumin/Creatinine Ratio, Urine; Future  4. Mixed hyperlipidemia  5. Menorrhagia with irregular cycle  -     norethindrone (AYGESTIN) 5 MG tablet; Take 1 tablet by mouth daily, Disp-30 tablet, R-0Normal  6. Primary hypertension    Return in about 3 months (around 2025).

## 2025-06-27 ENCOUNTER — RESULTS FOLLOW-UP (OUTPATIENT)
Facility: CLINIC | Age: 45
End: 2025-06-27

## 2025-06-27 LAB
EST. AVERAGE GLUCOSE BLD GHB EST-MCNC: 126 MG/DL
FERRITIN SERPL-MCNC: 69 NG/ML (ref 26–388)
FOLATE SERPL-MCNC: 6.5 NG/ML (ref 5–21)
HBA1C MFR BLD: 6 % (ref 4–5.6)
IRON SATN MFR SERPL: 30 % (ref 20–50)
IRON SERPL-MCNC: 123 UG/DL (ref 35–150)
TIBC SERPL-MCNC: 405 UG/DL (ref 250–450)
VIT B12 SERPL-MCNC: 182 PG/ML (ref 193–986)

## 2025-08-18 RX ORDER — ATENOLOL 50 MG/1
50 TABLET ORAL DAILY
Qty: 30 TABLET | Refills: 2 | Status: SHIPPED | OUTPATIENT
Start: 2025-08-18

## 2025-08-18 RX ORDER — AMLODIPINE BESYLATE 10 MG/1
10 TABLET ORAL DAILY
Qty: 30 TABLET | Refills: 2 | Status: SHIPPED | OUTPATIENT
Start: 2025-08-18